# Patient Record
Sex: FEMALE | Race: WHITE | Employment: PART TIME | URBAN - METROPOLITAN AREA
[De-identification: names, ages, dates, MRNs, and addresses within clinical notes are randomized per-mention and may not be internally consistent; named-entity substitution may affect disease eponyms.]

---

## 2017-05-16 ENCOUNTER — HOSPITAL ENCOUNTER (OUTPATIENT)
Dept: GENERAL RADIOLOGY | Age: 37
Discharge: HOME | End: 2017-05-16
Attending: FAMILY MEDICINE
Payer: COMMERCIAL

## 2017-05-16 ENCOUNTER — OFFICE VISIT (OUTPATIENT)
Dept: FAMILY MEDICINE CLINIC | Facility: CLINIC | Age: 37
End: 2017-05-16

## 2017-05-16 VITALS
TEMPERATURE: 98 F | BODY MASS INDEX: 48.82 KG/M2 | RESPIRATION RATE: 16 BRPM | HEART RATE: 77 BPM | SYSTOLIC BLOOD PRESSURE: 123 MMHG | WEIGHT: 293 LBS | DIASTOLIC BLOOD PRESSURE: 85 MMHG | HEIGHT: 65 IN

## 2017-05-16 DIAGNOSIS — A09 DIARRHEA OF INFECTIOUS ORIGIN: ICD-10-CM

## 2017-05-16 DIAGNOSIS — J30.89 OTHER ALLERGIC RHINITIS: ICD-10-CM

## 2017-05-16 DIAGNOSIS — M25.562 ACUTE PAIN OF LEFT KNEE: ICD-10-CM

## 2017-05-16 DIAGNOSIS — M22.2X2 PATELLOFEMORAL PAIN SYNDROME OF LEFT KNEE: ICD-10-CM

## 2017-05-16 DIAGNOSIS — M25.562 ACUTE PAIN OF LEFT KNEE: Primary | ICD-10-CM

## 2017-05-16 PROCEDURE — 99214 OFFICE O/P EST MOD 30 MIN: CPT | Performed by: FAMILY MEDICINE

## 2017-05-16 PROCEDURE — 99212 OFFICE O/P EST SF 10 MIN: CPT | Performed by: FAMILY MEDICINE

## 2017-05-16 PROCEDURE — 73564 X-RAY EXAM KNEE 4 OR MORE: CPT | Performed by: FAMILY MEDICINE

## 2017-05-16 RX ORDER — MONTELUKAST SODIUM 10 MG/1
10 TABLET ORAL NIGHTLY
COMMUNITY
End: 2017-05-16

## 2017-05-16 RX ORDER — MONTELUKAST SODIUM 10 MG/1
10 TABLET ORAL NIGHTLY
Qty: 90 TABLET | Refills: 1 | Status: SHIPPED | OUTPATIENT
Start: 2017-05-16 | End: 2018-05-15

## 2017-05-16 RX ORDER — MELOXICAM 15 MG/1
15 TABLET ORAL DAILY
Qty: 30 TABLET | Refills: 0 | Status: SHIPPED | OUTPATIENT
Start: 2017-05-16 | End: 2017-06-15

## 2017-05-16 NOTE — PROGRESS NOTES
Patient ID: Serg Norton is a 30year old female. HPI  Patient presents with:  Pain: left leg   Diarrhea    She states for very long time she been having some left anterior knee pain. She can feel some crepitus in her left knee.   It is worse when she distress. Abdominal: Normal appearance and bowel sounds are normal. There is    no tenderness. There is no rigidity, no rebound, no guarding and negative  Shipley's sign. Neurological: Patient is alert and oriented to person, place, and time.    Skin: tablet (10 mg total) by mouth nightly. While here she would like a refill of her Singulair which she uses for allergies. Follow up if symptoms persist.  Take medicine (if given) as prescribed.   Approach to treatment discussed and patient/family membe

## 2017-05-17 ENCOUNTER — PATIENT MESSAGE (OUTPATIENT)
Dept: FAMILY MEDICINE CLINIC | Facility: CLINIC | Age: 37
End: 2017-05-17

## 2017-05-19 NOTE — TELEPHONE ENCOUNTER
From: Ciro Antony  To: Harper Padilla DO  Sent: 5/17/2017 8:24 PM CDT  Subject: Test Results Question    Hello. I had some knee xrays on my last visit. Where can I see the images and radiologist report?      Cheri Hurtado

## 2017-08-07 ENCOUNTER — OFFICE VISIT (OUTPATIENT)
Dept: FAMILY MEDICINE CLINIC | Facility: CLINIC | Age: 37
End: 2017-08-07

## 2017-08-07 VITALS
HEIGHT: 65 IN | SYSTOLIC BLOOD PRESSURE: 130 MMHG | DIASTOLIC BLOOD PRESSURE: 89 MMHG | WEIGHT: 293 LBS | TEMPERATURE: 99 F | BODY MASS INDEX: 48.82 KG/M2 | HEART RATE: 79 BPM | RESPIRATION RATE: 14 BRPM

## 2017-08-07 DIAGNOSIS — M54.50 CHRONIC LEFT-SIDED LOW BACK PAIN WITHOUT SCIATICA: ICD-10-CM

## 2017-08-07 DIAGNOSIS — G89.29 CHRONIC LEFT-SIDED LOW BACK PAIN WITHOUT SCIATICA: ICD-10-CM

## 2017-08-07 DIAGNOSIS — S39.012A LUMBAR STRAIN, INITIAL ENCOUNTER: Primary | ICD-10-CM

## 2017-08-07 DIAGNOSIS — S29.019A THORACIC MYOFASCIAL STRAIN, INITIAL ENCOUNTER: ICD-10-CM

## 2017-08-07 PROCEDURE — 99212 OFFICE O/P EST SF 10 MIN: CPT | Performed by: FAMILY MEDICINE

## 2017-08-07 PROCEDURE — 99214 OFFICE O/P EST MOD 30 MIN: CPT | Performed by: FAMILY MEDICINE

## 2017-08-07 RX ORDER — CYCLOBENZAPRINE HCL 10 MG
10 TABLET ORAL NIGHTLY
Qty: 30 TABLET | Refills: 1 | Status: SHIPPED | OUTPATIENT
Start: 2017-08-07 | End: 2017-08-27

## 2017-08-07 RX ORDER — MELOXICAM 15 MG/1
15 TABLET ORAL DAILY
Qty: 30 TABLET | Refills: 1 | Status: SHIPPED | OUTPATIENT
Start: 2017-08-07 | End: 2017-09-06

## 2017-08-07 NOTE — PROGRESS NOTES
Patient ID: Hudson Aranda is a 30year old female. HPI  Patient presents with:  Back Pain: left lower and upper back   For 1 month now off-and-on she has been having some left low back pain.   She states she has been having it since high school but was tenderness over the lumbar paraspinal muscles from L2 down to L5 bilaterally on the left side but very mild in nature.     She also has some minimal tenderness in the right rhomboid area but no spasm in this area  Deep tendon reflexes were 2 out of 4 bilate total) by mouth daily. With meals. (pain/inflammation). -     PHYSICAL THERAPY EXTERNAL    Patient Instructions   If still having pain in 6-8 weeks then please see me. Follow up if symptoms persist.  Take medicine (if given) as prescribed.   Approac

## 2017-08-17 NOTE — TELEPHONE ENCOUNTER
Pt is requesting an order for a QuantiFERON-TB Gold so she dont have to comeback a second time just want her blood drawn and requesting to get a callback once order is completed.

## 2017-08-17 NOTE — TELEPHONE ENCOUNTER
Pt requesting refill on medication ALBUTEROL INHALER prescribed by Dr. Sukhdeep Antony from [facility] and Pt is requesting a call when refilled.

## 2017-08-21 NOTE — TELEPHONE ENCOUNTER
Pt states she was in school for [employer] and her old test . She needs a new test done since she is applying for jobs.

## 2017-09-21 ENCOUNTER — OFFICE VISIT (OUTPATIENT)
Dept: FAMILY MEDICINE CLINIC | Facility: CLINIC | Age: 37
End: 2017-09-21

## 2017-09-21 VITALS
BODY MASS INDEX: 48.82 KG/M2 | WEIGHT: 293 LBS | SYSTOLIC BLOOD PRESSURE: 122 MMHG | HEIGHT: 65 IN | TEMPERATURE: 98 F | DIASTOLIC BLOOD PRESSURE: 84 MMHG | HEART RATE: 89 BPM

## 2017-09-21 DIAGNOSIS — G89.29 CHRONIC PAIN OF LEFT KNEE: Primary | ICD-10-CM

## 2017-09-21 DIAGNOSIS — M25.462 EFFUSION OF LEFT KNEE: ICD-10-CM

## 2017-09-21 DIAGNOSIS — M25.562 CHRONIC PAIN OF LEFT KNEE: Primary | ICD-10-CM

## 2017-09-21 DIAGNOSIS — M25.562 LEFT MEDIAL KNEE PAIN: ICD-10-CM

## 2017-09-21 PROCEDURE — 99214 OFFICE O/P EST MOD 30 MIN: CPT | Performed by: FAMILY MEDICINE

## 2017-09-21 PROCEDURE — 99212 OFFICE O/P EST SF 10 MIN: CPT | Performed by: FAMILY MEDICINE

## 2017-09-21 NOTE — PROGRESS NOTES
Patient ID: Chris Mcginnis is a 30year old female. HPI  Patient presents with:  Knee Pain  She had last seen he was doing physical therapy for her back.   She states for 4 weeks ago she was doing an exercise where she had her knees on the table and then weight (!) 343 lb (155.6 kg), not currently breastfeeding. This 30year old female is A&O in no acute distress.   KNEE EXAM: LEFT    Range of Motion 0-120 Degrees (decreased)   Effusion ++   Swelling None   Erythema None   Atrophy None       Strength

## 2017-10-10 ENCOUNTER — HOSPITAL ENCOUNTER (OUTPATIENT)
Dept: MRI IMAGING | Age: 37
Discharge: HOME | End: 2017-10-10
Attending: FAMILY MEDICINE
Payer: COMMERCIAL

## 2017-10-10 DIAGNOSIS — M25.562 LEFT MEDIAL KNEE PAIN: ICD-10-CM

## 2017-10-10 DIAGNOSIS — M25.462 EFFUSION OF LEFT KNEE: ICD-10-CM

## 2017-10-10 DIAGNOSIS — M25.562 CHRONIC PAIN OF LEFT KNEE: ICD-10-CM

## 2017-10-10 DIAGNOSIS — G89.29 CHRONIC PAIN OF LEFT KNEE: ICD-10-CM

## 2017-10-10 PROCEDURE — 73721 MRI JNT OF LWR EXTRE W/O DYE: CPT | Performed by: FAMILY MEDICINE

## 2018-05-15 ENCOUNTER — HOSPITAL ENCOUNTER (OUTPATIENT)
Dept: GENERAL RADIOLOGY | Age: 38
Discharge: HOME | End: 2018-05-15
Attending: FAMILY MEDICINE
Payer: COMMERCIAL

## 2018-05-15 ENCOUNTER — OFFICE VISIT (OUTPATIENT)
Dept: FAMILY MEDICINE CLINIC | Facility: CLINIC | Age: 38
End: 2018-05-15

## 2018-05-15 VITALS
DIASTOLIC BLOOD PRESSURE: 89 MMHG | TEMPERATURE: 98 F | OXYGEN SATURATION: 97 % | HEIGHT: 65 IN | WEIGHT: 293 LBS | HEART RATE: 83 BPM | BODY MASS INDEX: 48.82 KG/M2 | SYSTOLIC BLOOD PRESSURE: 131 MMHG

## 2018-05-15 DIAGNOSIS — G47.33 OBSTRUCTIVE SLEEP APNEA SYNDROME: ICD-10-CM

## 2018-05-15 DIAGNOSIS — N39.3 STRESS INCONTINENCE IN FEMALE: ICD-10-CM

## 2018-05-15 DIAGNOSIS — J45.20 MILD INTERMITTENT REACTIVE AIRWAY DISEASE WITHOUT COMPLICATION: ICD-10-CM

## 2018-05-15 DIAGNOSIS — J03.90 TONSILLITIS: ICD-10-CM

## 2018-05-15 DIAGNOSIS — J34.3 HYPERTROPHY, NASAL, TURBINATE: ICD-10-CM

## 2018-05-15 DIAGNOSIS — R05.9 COUGH: ICD-10-CM

## 2018-05-15 DIAGNOSIS — R06.83 SNORING: ICD-10-CM

## 2018-05-15 DIAGNOSIS — R05.9 COUGH: Primary | ICD-10-CM

## 2018-05-15 DIAGNOSIS — J30.89 OTHER ALLERGIC RHINITIS: ICD-10-CM

## 2018-05-15 PROCEDURE — 99215 OFFICE O/P EST HI 40 MIN: CPT | Performed by: FAMILY MEDICINE

## 2018-05-15 PROCEDURE — 99212 OFFICE O/P EST SF 10 MIN: CPT | Performed by: FAMILY MEDICINE

## 2018-05-15 PROCEDURE — 71046 X-RAY EXAM CHEST 2 VIEWS: CPT | Performed by: FAMILY MEDICINE

## 2018-05-15 RX ORDER — MONTELUKAST SODIUM 10 MG/1
10 TABLET ORAL NIGHTLY
Qty: 90 TABLET | Refills: 1 | Status: SHIPPED | OUTPATIENT
Start: 2018-05-15 | End: 2019-04-18

## 2018-05-15 RX ORDER — AZELASTINE HYDROCHLORIDE, FLUTICASONE PROPIONATE 137; 50 UG/1; UG/1
2 SPRAY, METERED NASAL 2 TIMES DAILY
Qty: 1 BOTTLE | Refills: 1 | Status: SHIPPED | OUTPATIENT
Start: 2018-05-15 | End: 2019-01-16

## 2018-05-15 RX ORDER — MONTELUKAST SODIUM 10 MG/1
10 TABLET ORAL NIGHTLY
Qty: 90 TABLET | Refills: 1 | Status: SHIPPED | OUTPATIENT
Start: 2018-05-15 | End: 2018-05-15

## 2018-05-15 RX ORDER — AZELASTINE HYDROCHLORIDE, FLUTICASONE PROPIONATE 137; 50 UG/1; UG/1
2 SPRAY, METERED NASAL 2 TIMES DAILY
Qty: 1 BOTTLE | Refills: 1 | Status: SHIPPED | OUTPATIENT
Start: 2018-05-15 | End: 2018-05-15

## 2018-05-15 NOTE — PROGRESS NOTES
Patient ID: Yosef Santoyo is a 41year old female. HPI  Patient presents with: Allergies    She wanted to be worked in today. She states on April 25, 2018 she went to immediate care if she felt sick and was coughing.   She states her heart was racin diaphoresis, fatigue and fever. HENT: Positive for congestion. Negative for ear pain, facial swelling, postnasal drip, rhinorrhea, sinus pressure, sore throat and trouble swallowing. Eyes: Negative for visual disturbance.    Respiratory: Positive for a bilaterally without exudate or erythema. Eyes: Conjunctivae and EOM are normal.   Neck: Neck supple. No thyromegaly present. Cardiovascular: Normal rate, regular rhythm and normal heart sounds.     Pulmonary/Chest: Effort normal and breath sounds normal. CHEST (CPT=71046); Future  She needs to lose weight as she is morbidly obese. Snoring  She has sleep apnea. Stress incontinence and female  Due to the harsh coughing but I think this is really due to allergies and not asthma.   If she really wants to se

## 2018-05-15 NOTE — PATIENT INSTRUCTIONS
Get over-the-counter Allegra which is fexofenadine 180 mg. Take that every morning along with the nasal spray. Do the nasal spray then again at dinner and then do the Singulair at bedtime.   I will see the allergist as well as the ENT doctor due to the en

## 2018-05-15 NOTE — TELEPHONE ENCOUNTER
Pt called in, states that she has allergies but this year they are \"a million times worse. \" States that she can't speak too long or she coughs.  Unsure if she is becoming asthmatic because she noticed that when she got a breathing treatment in [place] recently

## 2018-09-06 ENCOUNTER — OFFICE VISIT (OUTPATIENT)
Dept: FAMILY MEDICINE CLINIC | Facility: CLINIC | Age: 38
End: 2018-09-06
Payer: COMMERCIAL

## 2018-09-06 ENCOUNTER — APPOINTMENT (OUTPATIENT)
Dept: LAB | Age: 38
End: 2018-09-06
Attending: FAMILY MEDICINE
Payer: COMMERCIAL

## 2018-09-06 ENCOUNTER — LAB ENCOUNTER (OUTPATIENT)
Dept: LAB | Age: 38
End: 2018-09-06
Attending: FAMILY MEDICINE
Payer: COMMERCIAL

## 2018-09-06 VITALS
DIASTOLIC BLOOD PRESSURE: 86 MMHG | TEMPERATURE: 98 F | HEIGHT: 65 IN | HEART RATE: 74 BPM | BODY MASS INDEX: 48.82 KG/M2 | SYSTOLIC BLOOD PRESSURE: 120 MMHG | WEIGHT: 293 LBS

## 2018-09-06 DIAGNOSIS — E66.01 MORBID OBESITY DUE TO EXCESS CALORIES (HCC): ICD-10-CM

## 2018-09-06 DIAGNOSIS — G47.33 OBSTRUCTIVE SLEEP APNEA SYNDROME: ICD-10-CM

## 2018-09-06 DIAGNOSIS — Z00.00 ADULT GENERAL MEDICAL EXAM: Primary | ICD-10-CM

## 2018-09-06 DIAGNOSIS — Z00.00 ADULT GENERAL MEDICAL EXAM: ICD-10-CM

## 2018-09-06 DIAGNOSIS — J45.20 MILD INTERMITTENT ASTHMA WITHOUT COMPLICATION: ICD-10-CM

## 2018-09-06 LAB
ALBUMIN SERPL BCP-MCNC: 3.5 G/DL (ref 3.5–4.8)
ALBUMIN/GLOB SERPL: 1 {RATIO} (ref 1–2)
ALP SERPL-CCNC: 84 U/L (ref 32–100)
ALT SERPL-CCNC: 30 U/L (ref 14–54)
ANION GAP SERPL CALC-SCNC: 7 MMOL/L (ref 0–18)
AST SERPL-CCNC: 20 U/L (ref 15–41)
BASOPHILS # BLD: 0.1 K/UL (ref 0–0.2)
BASOPHILS NFR BLD: 1 %
BILIRUB SERPL-MCNC: 0.6 MG/DL (ref 0.3–1.2)
BUN SERPL-MCNC: 15 MG/DL (ref 8–20)
BUN/CREAT SERPL: 22.7 (ref 10–20)
CALCIUM SERPL-MCNC: 8.8 MG/DL (ref 8.5–10.5)
CHLORIDE SERPL-SCNC: 105 MMOL/L (ref 95–110)
CHOLEST SERPL-MCNC: 187 MG/DL (ref 110–200)
CO2 SERPL-SCNC: 27 MMOL/L (ref 22–32)
CREAT SERPL-MCNC: 0.66 MG/DL (ref 0.5–1.5)
EOSINOPHIL # BLD: 0.1 K/UL (ref 0–0.7)
EOSINOPHIL NFR BLD: 2 %
ERYTHROCYTE [DISTWIDTH] IN BLOOD BY AUTOMATED COUNT: 14.4 % (ref 11–15)
GLOBULIN PLAS-MCNC: 3.4 G/DL (ref 2.5–3.7)
GLUCOSE SERPL-MCNC: 93 MG/DL (ref 70–99)
HBA1C MFR BLD: 5.5 % (ref 4–6)
HCT VFR BLD AUTO: 40.4 % (ref 35–48)
HDLC SERPL-MCNC: 33 MG/DL
HGB BLD-MCNC: 13.1 G/DL (ref 12–16)
LDLC SERPL CALC-MCNC: 134 MG/DL (ref 0–99)
LYMPHOCYTES # BLD: 1.6 K/UL (ref 1–4)
LYMPHOCYTES NFR BLD: 25 %
MCH RBC QN AUTO: 27.8 PG (ref 27–32)
MCHC RBC AUTO-ENTMCNC: 32.5 G/DL (ref 32–37)
MCV RBC AUTO: 85.4 FL (ref 80–100)
MONOCYTES # BLD: 0.4 K/UL (ref 0–1)
MONOCYTES NFR BLD: 6 %
NEUTROPHILS # BLD AUTO: 4.4 K/UL (ref 1.8–7.7)
NEUTROPHILS NFR BLD: 67 %
NONHDLC SERPL-MCNC: 154 MG/DL
OSMOLALITY UR CALC.SUM OF ELEC: 289 MOSM/KG (ref 275–295)
PATIENT FASTING: YES
PLATELET # BLD AUTO: 266 K/UL (ref 140–400)
PMV BLD AUTO: 8.6 FL (ref 7.4–10.3)
POTASSIUM SERPL-SCNC: 4.1 MMOL/L (ref 3.3–5.1)
PROT SERPL-MCNC: 6.9 G/DL (ref 5.9–8.4)
RBC # BLD AUTO: 4.73 M/UL (ref 3.7–5.4)
SODIUM SERPL-SCNC: 139 MMOL/L (ref 136–144)
TRIGL SERPL-MCNC: 101 MG/DL (ref 1–149)
TSH SERPL-ACNC: 1.03 UIU/ML (ref 0.45–5.33)
WBC # BLD AUTO: 6.6 K/UL (ref 4–11)

## 2018-09-06 PROCEDURE — 85025 COMPLETE CBC W/AUTO DIFF WBC: CPT

## 2018-09-06 PROCEDURE — 84443 ASSAY THYROID STIM HORMONE: CPT

## 2018-09-06 PROCEDURE — 83036 HEMOGLOBIN GLYCOSYLATED A1C: CPT

## 2018-09-06 PROCEDURE — 80061 LIPID PANEL: CPT

## 2018-09-06 PROCEDURE — 99395 PREV VISIT EST AGE 18-39: CPT | Performed by: FAMILY MEDICINE

## 2018-09-06 PROCEDURE — 36415 COLL VENOUS BLD VENIPUNCTURE: CPT

## 2018-09-06 PROCEDURE — 93005 ELECTROCARDIOGRAM TRACING: CPT

## 2018-09-06 PROCEDURE — 80053 COMPREHEN METABOLIC PANEL: CPT

## 2018-09-06 PROCEDURE — 93010 ELECTROCARDIOGRAM REPORT: CPT | Performed by: FAMILY MEDICINE

## 2018-09-06 RX ORDER — BUDESONIDE AND FORMOTEROL FUMARATE DIHYDRATE 160; 4.5 UG/1; UG/1
AEROSOL RESPIRATORY (INHALATION)
Refills: 2 | COMMUNITY
Start: 2018-07-02 | End: 2020-04-08

## 2018-09-06 NOTE — PATIENT INSTRUCTIONS
Go ahead and get me the shot records and my fax number [phone] as well as notes from your other providers through [facility].

## 2018-09-06 NOTE — PROGRESS NOTES
Patient ID: Elvira Hyde is a 41year old female.     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 Neurological: Negative for dizziness, seizures, speech difficulty and light-headedness. Hematological: Negative for adenopathy. Does not bruise/bleed easily. Psychiatric/Behavioral: Positive for dysphoric mood.  Negative for hallucinations, light.   Neck: Normal range of motion. Neck supple. No thyromegaly present. Cardiovascular: Normal rate, regular rhythm and no murmur heard. Pulmonary/Chest: Effort normal and breath sounds normal. No respiratory distress. Abdominal: Soft.  Bowel sound due to excess calories (HCC)  -     HEMOGLOBIN A1C; Future  -     EKG 12-LEAD; Future  -     BARIATRICS - INTERNAL    Mild intermittent asthma without complication  Continue the Symbicort and continue to follow with ENT and pulmonology.       Referrals (if

## 2018-12-20 ENCOUNTER — TELEPHONE (OUTPATIENT)
Dept: FAMILY MEDICINE CLINIC | Facility: CLINIC | Age: 38
End: 2018-12-20

## 2018-12-21 RX ORDER — ALBUTEROL SULFATE 90 UG/1
2 AEROSOL, METERED RESPIRATORY (INHALATION) EVERY 6 HOURS PRN
Qty: 1 INHALER | Refills: 1 | Status: SHIPPED | OUTPATIENT
Start: 2018-12-21 | End: 2019-04-18

## 2019-01-16 ENCOUNTER — TELEPHONE (OUTPATIENT)
Dept: OTHER | Age: 39
End: 2019-01-16

## 2019-01-16 DIAGNOSIS — J30.89 OTHER ALLERGIC RHINITIS: Primary | ICD-10-CM

## 2019-01-16 RX ORDER — MOMETASONE 50 UG/1
2 SPRAY, METERED NASAL DAILY
Qty: 1 INHALER | Refills: 3 | Status: SHIPPED | OUTPATIENT
Start: 2019-01-16 | End: 2019-04-18

## 2019-01-16 NOTE — TELEPHONE ENCOUNTER
Patient called stating she needs a PA for Dymista. PA for Azelastine-Fluticasone (DYMISTA) 137-50 MCG/ACT Nasal Suspension completed with Emely via CMM response time 24-72 hours KEY [id number].

## 2019-01-16 NOTE — TELEPHONE ENCOUNTER
PA denied. Plan states preferred drugs are budesonide nasal suspension, flunisolide nasal suspension, fluticasone nasal suspension, mometasone nasal suspension, and triamcinolone nasal aerosol. Please advise.

## 2019-01-17 ENCOUNTER — OFFICE VISIT (OUTPATIENT)
Dept: FAMILY MEDICINE CLINIC | Facility: CLINIC | Age: 39
End: 2019-01-17
Payer: COMMERCIAL

## 2019-01-17 ENCOUNTER — NURSE TRIAGE (OUTPATIENT)
Dept: OTHER | Age: 39
End: 2019-01-17

## 2019-01-17 VITALS
SYSTOLIC BLOOD PRESSURE: 138 MMHG | DIASTOLIC BLOOD PRESSURE: 80 MMHG | HEART RATE: 108 BPM | OXYGEN SATURATION: 95 % | BODY MASS INDEX: 61 KG/M2 | HEIGHT: 65 IN | TEMPERATURE: 99 F

## 2019-01-17 DIAGNOSIS — N39.3 STRESS INCONTINENCE: ICD-10-CM

## 2019-01-17 DIAGNOSIS — J06.9 VIRAL UPPER RESPIRATORY TRACT INFECTION: ICD-10-CM

## 2019-01-17 DIAGNOSIS — J40 BRONCHITIS: Primary | ICD-10-CM

## 2019-01-17 PROCEDURE — 99214 OFFICE O/P EST MOD 30 MIN: CPT | Performed by: NURSE PRACTITIONER

## 2019-01-17 RX ORDER — PREDNISONE 20 MG/1
TABLET ORAL
Qty: 10 TABLET | Refills: 0 | Status: SHIPPED | OUTPATIENT
Start: 2019-01-17 | End: 2020-01-02 | Stop reason: ALTCHOICE

## 2019-01-17 RX ORDER — AZITHROMYCIN 250 MG/1
TABLET, FILM COATED ORAL
Qty: 6 TABLET | Refills: 0 | Status: SHIPPED | OUTPATIENT
Start: 2019-01-17 | End: 2019-04-18

## 2019-01-17 NOTE — TELEPHONE ENCOUNTER
Karo Cervantes      Future Appointments   Date Time Provider Ap Radha   1/17/2019  2:15 PM CARMEN Bautista, FNP-C [facility] ADO     Pt contacted and informed to come in for an office visit instead. Pt states she is on the way.

## 2019-01-17 NOTE — PATIENT INSTRUCTIONS
Bronchitis, Antibiotic Treatment (Adult)    Bronchitis is an infection of the air passages (bronchial tubes) in your lungs. It often occurs when you have a cold.  This illness is contagious during the first few days and is spread through the air by coughi Follow-up care  Follow up with your healthcare provider, or as advised. If you had an X-ray or ECG (electrocardiogram), a specialist will review it. You will be notified of any new findings that may affect your care.   If you are age 72 or older, or if you This technique is taught by a nurse or physical therapist. During the therapy, a small sensor is placed in your vagina or rectum. Another sensor is placed on your stomach. Other types of sensors are also available.  These sensors read signals from the pelvi

## 2019-01-17 NOTE — TELEPHONE ENCOUNTER
Action Requested: Summary for Provider     []  Critical Lab, Recommendations Needed  [x] Need Additional Advice  []   FYI    []   Need Orders  [x] Need Medications Sent to Pharmacy  []  Other     SUMMARY: Pt requesting for Z-Karson to be sent to pharmacy.  Ple

## 2019-01-17 NOTE — PROGRESS NOTES
HPI  Pt here for cold s/s that started about 4 days ago. Congestion, runny nose. Denies fever, ear pain, sore throat. Has h/o reactive airway diseases. Coughing has worsened and by last night was waking up every hour last night due to coughing.   Had estephanie Transportation needs - non-medical: Not on file    Occupational History      Not on file    Tobacco Use      Smoking status: Never Smoker      Smokeless tobacco: Never Used    Substance and Sexual Activity      Alcohol use: No        Alcohol/week: 0.0 oz Posterior oropharyngeal erythema present. No oropharyngeal exudate or posterior oropharyngeal edema. Eyes: Conjunctivae and EOM are normal. Pupils are equal, round, and reactive to light. Right eye exhibits no discharge. Left eye exhibits no discharge.

## 2019-01-18 NOTE — TELEPHONE ENCOUNTER
lmtcb    Kali Larson, DO   Em Fm Ado Lpn/Cma 2 days ago        nasonex sent in as dymista not covered.

## 2019-01-20 PROBLEM — J06.9 VIRAL UPPER RESPIRATORY TRACT INFECTION: Status: ACTIVE | Noted: 2019-01-20

## 2019-01-20 NOTE — ASSESSMENT & PLAN NOTE
zpack as directed  Use spacer with proair  Prednisone 20 mg 2 tabls po q day x 5 days  Supportive care discussed      Please call if symptoms worsen or are not resolving.

## 2019-04-16 DIAGNOSIS — J40 BRONCHITIS: ICD-10-CM

## 2019-04-16 NOTE — TELEPHONE ENCOUNTER
Pt states upper respiratory problem for four days with difficulty breathing as she has reactive airway disease. Denies fever, cp. Patient requesting refill of Prednisone 20 mg tabs.  OV scheduled with Dr. Meghana Baez 4/18 at 3:10 pm. Advised pt to go to ER if sy

## 2019-04-17 ENCOUNTER — TELEPHONE (OUTPATIENT)
Dept: FAMILY MEDICINE CLINIC | Facility: CLINIC | Age: 39
End: 2019-04-17

## 2019-04-17 NOTE — TELEPHONE ENCOUNTER
Paging    Message # [phone]         2019 08:11p   [THU]  To:  From:  OBDULIO Farley MD:  Phone#:  ----------------------------------------------------------------------  Kev Abbott [phone]  [address] PT OF JIM, YOUR OFFICE SAID THEY   WERE

## 2019-04-18 ENCOUNTER — HOSPITAL ENCOUNTER (OUTPATIENT)
Dept: GENERAL RADIOLOGY | Age: 39
Discharge: HOME | End: 2019-04-18
Attending: FAMILY MEDICINE
Payer: COMMERCIAL

## 2019-04-18 ENCOUNTER — OFFICE VISIT (OUTPATIENT)
Dept: FAMILY MEDICINE CLINIC | Facility: CLINIC | Age: 39
End: 2019-04-18
Payer: COMMERCIAL

## 2019-04-18 VITALS
HEIGHT: 65 IN | TEMPERATURE: 98 F | SYSTOLIC BLOOD PRESSURE: 130 MMHG | RESPIRATION RATE: 16 BRPM | DIASTOLIC BLOOD PRESSURE: 79 MMHG | BODY MASS INDEX: 61 KG/M2 | HEART RATE: 90 BPM

## 2019-04-18 DIAGNOSIS — R06.2 WHEEZING: ICD-10-CM

## 2019-04-18 DIAGNOSIS — R05.9 COUGH: Primary | ICD-10-CM

## 2019-04-18 DIAGNOSIS — J30.89 OTHER ALLERGIC RHINITIS: ICD-10-CM

## 2019-04-18 DIAGNOSIS — R05.9 COUGH: ICD-10-CM

## 2019-04-18 DIAGNOSIS — J03.90 TONSILLITIS: ICD-10-CM

## 2019-04-18 PROCEDURE — 99212 OFFICE O/P EST SF 10 MIN: CPT | Performed by: FAMILY MEDICINE

## 2019-04-18 PROCEDURE — 99214 OFFICE O/P EST MOD 30 MIN: CPT | Performed by: FAMILY MEDICINE

## 2019-04-18 PROCEDURE — 71046 X-RAY EXAM CHEST 2 VIEWS: CPT | Performed by: FAMILY MEDICINE

## 2019-04-18 RX ORDER — MOMETASONE 50 UG/1
2 SPRAY, METERED NASAL DAILY
Qty: 1 INHALER | Refills: 3 | Status: SHIPPED | OUTPATIENT
Start: 2019-04-18 | End: 2019-05-18

## 2019-04-18 RX ORDER — ALBUTEROL SULFATE 90 UG/1
2 AEROSOL, METERED RESPIRATORY (INHALATION) EVERY 6 HOURS PRN
Qty: 1 INHALER | Refills: 1 | Status: SHIPPED | OUTPATIENT
Start: 2019-04-18

## 2019-04-18 RX ORDER — GUAIFENESIN 600 MG
1200 TABLET, EXTENDED RELEASE 12 HR ORAL 2 TIMES DAILY
Qty: 20 TABLET | Refills: 0 | Status: SHIPPED | OUTPATIENT
Start: 2019-04-18 | End: 2020-01-02 | Stop reason: ALTCHOICE

## 2019-04-18 RX ORDER — PREDNISONE 20 MG/1
TABLET ORAL
Qty: 10 TABLET | Refills: 0 | Status: SHIPPED | OUTPATIENT
Start: 2019-04-18 | End: 2019-04-22

## 2019-04-18 RX ORDER — MONTELUKAST SODIUM 10 MG/1
10 TABLET ORAL NIGHTLY
Qty: 90 TABLET | Refills: 1 | Status: SHIPPED | OUTPATIENT
Start: 2019-04-18 | End: 2021-12-06

## 2019-04-18 NOTE — PROGRESS NOTES
Patient ID: Serg Norton is a 42year old female. HPI  Patient presents with:  Cough    She has had a cough and difficulty breathing since 4/12/19. Her sx are cough and rhinorrhea with green and yellowish mucous.   She feels something in her bronchiole Constitutional: Negative for chills and fever. HENT: Positive for rhinorrhea. Negative for voice change. Respiratory: Positive for cough and wheezing. Negative for chest tightness and shortness of breath. Cardiovascular: Negative for chest pain. rhinorrhea. Nose: Pale boggy nasal mucosa with clear rhinorrhea. Turbinates: mild congestion on the right and moderate congestion on the left. Tonsils: 2+  THROAT: Oropharynx looks crowded due to 2+ tonsils.  Without exudate   Eyes: Conjunctivae and EOM (SINGULAIR) 10 MG Oral Tab; Take 1 tablet (10 mg total) by mouth nightly. -     Albuterol Sulfate  (90 Base) MCG/ACT Inhalation Aero Soln; Inhale 2 puffs into the lungs every 6 (six) hours as needed for Wheezing or Shortness of Breath.   -     XR CH and agree that the record reflects my personal performance and is accurate and complete.   Masood Francis DO, 4/18/2019, 3:34 PM

## 2019-04-22 DIAGNOSIS — R06.2 WHEEZING: ICD-10-CM

## 2019-04-22 DIAGNOSIS — J03.90 TONSILLITIS: ICD-10-CM

## 2019-04-22 DIAGNOSIS — R05.9 COUGH: ICD-10-CM

## 2019-04-23 RX ORDER — PREDNISONE 20 MG/1
TABLET ORAL
Qty: 10 TABLET | Refills: 1 | Status: SHIPPED | OUTPATIENT
Start: 2019-04-23 | End: 2019-05-03

## 2019-04-23 NOTE — TELEPHONE ENCOUNTER
Pt is calling again to follow up refill request for Prednisone. She is getting anxious of may not be able to get the medication before 5 pm., leaving tonite. Dr Jennifer Michel please advise. Please call pt or send [employer] message if refill has been approved.  Torrie Covarrubias

## 2019-04-23 NOTE — TELEPHONE ENCOUNTER
Patient leaving in 10 hours to [facility] and indicated that Dr Brianna Dick recommended patient have it while in [place] especially because of the air quality. Please advise.

## 2019-04-23 NOTE — TELEPHONE ENCOUNTER
RN pls call pt and verify the need of rx refill, pls triage or offer ov if needed, thanks. LR 1-17-19 # 10    Review pended refill request as it does not fall under a protocol.   Requested Prescriptions     Pending Prescriptions Disp Refills   • PREDNIS

## 2019-05-03 ENCOUNTER — TELEPHONE (OUTPATIENT)
Dept: FAMILY MEDICINE CLINIC | Facility: CLINIC | Age: 39
End: 2019-05-03

## 2019-05-03 DIAGNOSIS — J03.90 TONSILLITIS: ICD-10-CM

## 2019-05-03 DIAGNOSIS — R05.9 COUGH: ICD-10-CM

## 2019-05-03 DIAGNOSIS — R06.2 WHEEZING: ICD-10-CM

## 2019-05-03 RX ORDER — PREDNISONE 20 MG/1
TABLET ORAL
Qty: 10 TABLET | Refills: 0 | Status: SHIPPED | OUTPATIENT
Start: 2019-05-03 | End: 2020-01-02 | Stop reason: ALTCHOICE
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
## 2019-05-22 RX ORDER — PREDNISONE 20 MG/1
TABLET ORAL
Qty: 10 TABLET | Refills: 0 | OUTPATIENT
Start: 2019-05-22

## 2020-01-02 ENCOUNTER — OFFICE VISIT (OUTPATIENT)
Dept: FAMILY MEDICINE CLINIC | Facility: CLINIC | Age: 40
End: 2020-01-02
Payer: COMMERCIAL

## 2020-01-02 ENCOUNTER — LAB ENCOUNTER (OUTPATIENT)
Dept: LAB | Age: 40
End: 2020-01-02
Attending: NURSE PRACTITIONER
Payer: COMMERCIAL

## 2020-01-02 VITALS
SYSTOLIC BLOOD PRESSURE: 126 MMHG | WEIGHT: 293 LBS | HEIGHT: 65 IN | DIASTOLIC BLOOD PRESSURE: 80 MMHG | BODY MASS INDEX: 48.82 KG/M2

## 2020-01-02 DIAGNOSIS — Z00.00 WELL ADULT EXAM: ICD-10-CM

## 2020-01-02 DIAGNOSIS — Z11.3 SCREEN FOR STD (SEXUALLY TRANSMITTED DISEASE): Primary | ICD-10-CM

## 2020-01-02 DIAGNOSIS — Z11.3 SCREEN FOR STD (SEXUALLY TRANSMITTED DISEASE): ICD-10-CM

## 2020-01-02 LAB
ALBUMIN SERPL-MCNC: 3.5 G/DL (ref 3.4–5)
ALBUMIN/GLOB SERPL: 0.9 {RATIO} (ref 1–2)
ALP LIVER SERPL-CCNC: 99 U/L (ref 37–98)
ALT SERPL-CCNC: 40 U/L (ref 13–56)
ANION GAP SERPL CALC-SCNC: 6 MMOL/L (ref 0–18)
AST SERPL-CCNC: 26 U/L (ref 15–37)
BASOPHILS # BLD AUTO: 0.03 X10(3) UL (ref 0–0.2)
BASOPHILS NFR BLD AUTO: 0.4 %
BILIRUB SERPL-MCNC: 0.5 MG/DL (ref 0.1–2)
BUN BLD-MCNC: 16 MG/DL (ref 7–18)
BUN/CREAT SERPL: 20.5 (ref 10–20)
CALCIUM BLD-MCNC: 9 MG/DL (ref 8.5–10.1)
CHLORIDE SERPL-SCNC: 108 MMOL/L (ref 98–112)
CHOLEST SMN-MCNC: 199 MG/DL (ref ?–200)
CO2 SERPL-SCNC: 27 MMOL/L (ref 21–32)
CREAT BLD-MCNC: 0.78 MG/DL (ref 0.55–1.02)
DEPRECATED RDW RBC AUTO: 42.4 FL (ref 35.1–46.3)
EOSINOPHIL # BLD AUTO: 0.14 X10(3) UL (ref 0–0.7)
EOSINOPHIL NFR BLD AUTO: 1.9 %
ERYTHROCYTE [DISTWIDTH] IN BLOOD BY AUTOMATED COUNT: 13.2 % (ref 11–15)
EST. AVERAGE GLUCOSE BLD GHB EST-MCNC: 117 MG/DL (ref 68–126)
GLOBULIN PLAS-MCNC: 4 G/DL (ref 2.8–4.4)
GLUCOSE BLD-MCNC: 84 MG/DL (ref 70–99)
HBA1C MFR BLD HPLC: 5.7 % (ref ?–5.7)
HCT VFR BLD AUTO: 42.9 % (ref 35–48)
HDLC SERPL-MCNC: 31 MG/DL (ref 40–59)
HGB BLD-MCNC: 13.3 G/DL (ref 12–16)
IMM GRANULOCYTES # BLD AUTO: 0.02 X10(3) UL (ref 0–1)
IMM GRANULOCYTES NFR BLD: 0.3 %
LDLC SERPL CALC-MCNC: 145 MG/DL (ref ?–100)
LYMPHOCYTES # BLD AUTO: 1.95 X10(3) UL (ref 1–4)
LYMPHOCYTES NFR BLD AUTO: 26.5 %
M PROTEIN MFR SERPL ELPH: 7.5 G/DL (ref 6.4–8.2)
MCH RBC QN AUTO: 27.2 PG (ref 26–34)
MCHC RBC AUTO-ENTMCNC: 31 G/DL (ref 31–37)
MCV RBC AUTO: 87.7 FL (ref 80–100)
MONOCYTES # BLD AUTO: 0.47 X10(3) UL (ref 0.1–1)
MONOCYTES NFR BLD AUTO: 6.4 %
NEUTROPHILS # BLD AUTO: 4.74 X10 (3) UL (ref 1.5–7.7)
NEUTROPHILS # BLD AUTO: 4.74 X10(3) UL (ref 1.5–7.7)
NEUTROPHILS NFR BLD AUTO: 64.5 %
NONHDLC SERPL-MCNC: 168 MG/DL (ref ?–130)
OSMOLALITY SERPL CALC.SUM OF ELEC: 292 MOSM/KG (ref 275–295)
PATIENT FASTING Y/N/NP: YES
PATIENT FASTING Y/N/NP: YES
PLATELET # BLD AUTO: 287 10(3)UL (ref 150–450)
POTASSIUM SERPL-SCNC: 4.1 MMOL/L (ref 3.5–5.1)
RBC # BLD AUTO: 4.89 X10(6)UL (ref 3.8–5.3)
SODIUM SERPL-SCNC: 141 MMOL/L (ref 136–145)
TRIGL SERPL-MCNC: 116 MG/DL (ref 30–149)
TSI SER-ACNC: 1.1 MIU/ML (ref 0.36–3.74)
VIT B12 SERPL-MCNC: 371 PG/ML (ref 193–986)
VLDLC SERPL CALC-MCNC: 23 MG/DL (ref 0–30)
WBC # BLD AUTO: 7.4 X10(3) UL (ref 4–11)

## 2020-01-02 PROCEDURE — 85025 COMPLETE CBC W/AUTO DIFF WBC: CPT

## 2020-01-02 PROCEDURE — 84443 ASSAY THYROID STIM HORMONE: CPT

## 2020-01-02 PROCEDURE — 82306 VITAMIN D 25 HYDROXY: CPT

## 2020-01-02 PROCEDURE — 87591 N.GONORRHOEAE DNA AMP PROB: CPT

## 2020-01-02 PROCEDURE — 80061 LIPID PANEL: CPT

## 2020-01-02 PROCEDURE — 36415 COLL VENOUS BLD VENIPUNCTURE: CPT

## 2020-01-02 PROCEDURE — 87389 HIV-1 AG W/HIV-1&-2 AB AG IA: CPT

## 2020-01-02 PROCEDURE — 80053 COMPREHEN METABOLIC PANEL: CPT

## 2020-01-02 PROCEDURE — 87491 CHLMYD TRACH DNA AMP PROBE: CPT

## 2020-01-02 PROCEDURE — 86780 TREPONEMA PALLIDUM: CPT

## 2020-01-02 PROCEDURE — 83036 HEMOGLOBIN GLYCOSYLATED A1C: CPT

## 2020-01-02 PROCEDURE — 82607 VITAMIN B-12: CPT

## 2020-01-02 PROCEDURE — 99395 PREV VISIT EST AGE 18-39: CPT | Performed by: NURSE PRACTITIONER

## 2020-01-02 RX ORDER — MOMETASONE 50 UG/1
SPRAY, METERED NASAL
COMMUNITY
Start: 2019-11-11 | End: 2021-08-16

## 2020-01-02 NOTE — PROGRESS NOTES
HPI    Patient presents for annual physical.  Positive for past medical history; history of asthma. Negative for complaints of health. Gyne history -   Last pap - 2 years ago, normal.    LMP - No longer gets period. Had iud.     Birth control - IUD Paternal Grandmother    • Heart Disorder Paternal Grandfather        Social History    Socioeconomic History      Marital status: Single      Spouse name: Not on file      Number of children: Not on file      Years of education: Not on file      [facility] ed Spacer/Aero Chamber Mouthpiece Does not apply Misc Use with HFA inhaler as directed 1 each 0   • SYMBICORT 160-4.5 MCG/ACT Inhalation Aerosol INHALE 2 PUFFS BY MOUTH TWICE DAILY  2       Allergies:    Seasonal                OTHER (SEE COMMENTS)    Physica B12    TSH W REFLEX TO FREE T4      Discussed plan of care with patient and patient is in agreement. All questions answered. Patient to call with questions or concerns. Encouraged to sign up for My Chart if not already registered.

## 2020-01-03 DIAGNOSIS — E55.9 VITAMIN D DEFICIENCY: Primary | ICD-10-CM

## 2020-01-03 LAB
25(OH)D3 SERPL-MCNC: 13 NG/ML (ref 30–100)
C TRACH DNA SPEC QL NAA+PROBE: NEGATIVE
N GONORRHOEA DNA SPEC QL NAA+PROBE: NEGATIVE
T PALLIDUM AB SER QL: NEGATIVE

## 2020-01-03 RX ORDER — ERGOCALCIFEROL 1.25 MG/1
50000 CAPSULE ORAL WEEKLY
Qty: 12 CAPSULE | Refills: 1 | Status: SHIPPED | OUTPATIENT
Start: 2020-01-03 | End: 2020-06-13

## 2020-01-30 ENCOUNTER — TELEPHONE (OUTPATIENT)
Dept: FAMILY MEDICINE CLINIC | Facility: CLINIC | Age: 40
End: 2020-01-30

## 2020-01-30 NOTE — TELEPHONE ENCOUNTER
Left detailed message concerning injections, advised to call back and make an appt.    Number and office hours provided

## 2020-01-30 NOTE — TELEPHONE ENCOUNTER
Patient reports seen in [address] [place] [address] Rd 1/2/20, had screening done for concerns of Syphilis exposure by partner. Test had come out negative, however partner had appt yesterday and was confirmed to be positive, started injection treatment yesterday.  Patient wanting to k

## 2020-02-03 ENCOUNTER — OFFICE VISIT (OUTPATIENT)
Dept: FAMILY MEDICINE CLINIC | Facility: CLINIC | Age: 40
End: 2020-02-03
Payer: COMMERCIAL

## 2020-02-03 VITALS
HEIGHT: 65 IN | HEART RATE: 80 BPM | SYSTOLIC BLOOD PRESSURE: 136 MMHG | WEIGHT: 293 LBS | BODY MASS INDEX: 48.82 KG/M2 | DIASTOLIC BLOOD PRESSURE: 83 MMHG

## 2020-02-03 DIAGNOSIS — Z20.2 EXPOSURE TO SYPHILIS: Primary | ICD-10-CM

## 2020-02-03 PROCEDURE — 99213 OFFICE O/P EST LOW 20 MIN: CPT | Performed by: NURSE PRACTITIONER

## 2020-02-03 PROCEDURE — 96372 THER/PROPH/DIAG INJ SC/IM: CPT | Performed by: NURSE PRACTITIONER

## 2020-02-03 NOTE — PATIENT INSTRUCTIONS
Syphilis  The sexually transmitted disease syphilis is a bacterial infection. Left untreated, it can cause heart and brain damage, even death. Pregnant women can infect their unborn babies. This can lead to deformities or even death.     People don’t talk © 1181-4465 [address]. [address] [facility], [facility] [place]. All rights reserved. This information is not intended as a substitute for professional medical care. Always follow your healthcare professional's instructions.

## 2020-02-03 NOTE — PROGRESS NOTES
HPI    Pt here for f/u Having BF having +RPR. At same time he had resp infection. Was given PCN shots. Did have sex once after pt's BF had first shot.      Review of Systems     02/03/20  1521   BP: 136/83   Pulse: 80   Weight: (!) 375 lb (170.1 kg)   He organization: Not on file        Attends meetings of clubs or organizations: Not on file        Relationship status: Not on file      Intimate partner violence:        Fear of current or ex partner: Not on file        Emotionally abused: Not on file

## 2020-04-08 ENCOUNTER — TELEPHONE (OUTPATIENT)
Dept: OTHER | Age: 40
End: 2020-04-08

## 2020-04-08 NOTE — TELEPHONE ENCOUNTER
Patient has asthma and reactive airway disease. No symptoms, but would like to have refill on symbicort and Rx for prednisone to have on hand just in case she needs it. 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    P

## 2020-04-09 RX ORDER — BUDESONIDE AND FORMOTEROL FUMARATE DIHYDRATE 160; 4.5 UG/1; UG/1
2 AEROSOL RESPIRATORY (INHALATION) 2 TIMES DAILY
Qty: 1 INHALER | Refills: 1 | Status: SHIPPED | OUTPATIENT
Start: 2020-04-09 | End: 2021-12-06

## 2020-04-09 NOTE — TELEPHONE ENCOUNTER
symbicort refilled. Please call if s/s of asthma worsen and are not relieved by sympbicort-we then can evaluate the need for steroids.

## 2020-04-11 ENCOUNTER — TELEPHONE (OUTPATIENT)
Dept: FAMILY MEDICINE CLINIC | Facility: CLINIC | Age: 40
End: 2020-04-11

## 2020-04-11 DIAGNOSIS — J30.89 OTHER ALLERGIC RHINITIS: ICD-10-CM

## 2020-04-11 DIAGNOSIS — R06.2 WHEEZING: ICD-10-CM

## 2020-04-11 DIAGNOSIS — J45.20 MILD INTERMITTENT REACTIVE AIRWAY DISEASE WITHOUT COMPLICATION: Primary | ICD-10-CM

## 2020-04-11 DIAGNOSIS — R05.9 COUGH: ICD-10-CM

## 2020-04-11 PROCEDURE — 99214 OFFICE O/P EST MOD 30 MIN: CPT | Performed by: FAMILY MEDICINE

## 2020-04-11 RX ORDER — PREDNISONE 20 MG/1
TABLET ORAL
Qty: 10 TABLET | Refills: 0 | Status: SHIPPED | OUTPATIENT
Start: 2020-04-11 | End: 2020-07-24

## 2020-04-11 NOTE — TELEPHONE ENCOUNTER
Virtual Check-In    TELEPHONE VISIT PROGRESS NOTE  Todays date: 4/11/2020 4:33 PM        Most recent Nurse Triage message / [address] [place] [address] [address] message from patient:      Claudy Chandler RN   Registered Nurse      Telephone Encounter   Signed   Encounter Date:  4/8/2020 I also received a page today stating that patient is wheezing and having asthma issues. She states this flared up about 2 days ago when her allergies got bad.   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 Chest pain:   Yes []     No [x]      ? Other symptoms:   ? Runny Nose Yes []     No [x]     ? Stuffy Nose Yes []     No [x]     ? Post Nasal Drip Yes [x]     No []         Past medical/social history:   ? Hypertension: Yes []     No [x]     ?  Allergic Rh She will make sure to  the Symbicort. She is on all of her allergy medications and will start taking the Symbicort today. I think 5 days of prednisone will be very helpful for her.   She remembers being on it in the past with me and states it helpe • SYMBICORT 160-4.5 MCG/ACT Inhalation Aerosol Inhale 2 puffs into the lungs 2 (two) times daily. 1 Inhaler 1   • ergocalciferol 1.25 MG (94749 UT) Oral Cap Take 1 capsule (50,000 Units total) by mouth once a week for 24 doses.  12 capsule 1   • Mometasone

## 2020-07-06 ENCOUNTER — TELEPHONE (OUTPATIENT)
Dept: FAMILY MEDICINE CLINIC | Facility: CLINIC | Age: 40
End: 2020-07-06

## 2020-07-06 NOTE — TELEPHONE ENCOUNTER
Patient requesting pre-op appt, will be having surgery to removal tonsils on 7/31 with Chelsea. Pre-op visit scheduled for 7/24 9:30am in office. 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  Pt denied currently having any sym

## 2020-07-07 NOTE — TELEPHONE ENCOUNTER
Noted.  Bring the paperwork your surgeon has for you with regard to the labs and of course call the information they have about what type of surgery will be having and the name of the surgeon etc.

## 2020-07-24 ENCOUNTER — LAB ENCOUNTER (OUTPATIENT)
Dept: LAB | Age: 40
End: 2020-07-24
Attending: FAMILY MEDICINE
Payer: COMMERCIAL

## 2020-07-24 ENCOUNTER — OFFICE VISIT (OUTPATIENT)
Dept: FAMILY MEDICINE CLINIC | Facility: CLINIC | Age: 40
End: 2020-07-24
Payer: COMMERCIAL

## 2020-07-24 VITALS
TEMPERATURE: 99 F | BODY MASS INDEX: 48.82 KG/M2 | WEIGHT: 293 LBS | SYSTOLIC BLOOD PRESSURE: 118 MMHG | HEIGHT: 65 IN | DIASTOLIC BLOOD PRESSURE: 85 MMHG | HEART RATE: 85 BPM

## 2020-07-24 DIAGNOSIS — J03.90 TONSILLITIS: ICD-10-CM

## 2020-07-24 DIAGNOSIS — Z01.818 PREOP EXAMINATION: ICD-10-CM

## 2020-07-24 DIAGNOSIS — J45.20 MILD INTERMITTENT REACTIVE AIRWAY DISEASE WITHOUT COMPLICATION: ICD-10-CM

## 2020-07-24 DIAGNOSIS — G47.33 OBSTRUCTIVE SLEEP APNEA SYNDROME: ICD-10-CM

## 2020-07-24 DIAGNOSIS — E55.9 VITAMIN D DEFICIENCY: ICD-10-CM

## 2020-07-24 DIAGNOSIS — E66.01 MORBID OBESITY DUE TO EXCESS CALORIES (HCC): ICD-10-CM

## 2020-07-24 DIAGNOSIS — J03.90 TONSILLITIS: Primary | ICD-10-CM

## 2020-07-24 LAB
APTT PPP: 28.6 SECONDS (ref 23.2–35.3)
BASOPHILS # BLD AUTO: 0.03 X10(3) UL (ref 0–0.2)
BASOPHILS NFR BLD AUTO: 0.4 %
DEPRECATED RDW RBC AUTO: 44.4 FL (ref 35.1–46.3)
EOSINOPHIL # BLD AUTO: 0.13 X10(3) UL (ref 0–0.7)
EOSINOPHIL NFR BLD AUTO: 1.7 %
ERYTHROCYTE [DISTWIDTH] IN BLOOD BY AUTOMATED COUNT: 13.7 % (ref 11–15)
HCT VFR BLD AUTO: 42.7 % (ref 35–48)
HGB BLD-MCNC: 13.4 G/DL (ref 12–16)
IMM GRANULOCYTES # BLD AUTO: 0.02 X10(3) UL (ref 0–1)
IMM GRANULOCYTES NFR BLD: 0.3 %
INR BLD: 0.94 (ref 0.9–1.2)
LYMPHOCYTES # BLD AUTO: 1.89 X10(3) UL (ref 1–4)
LYMPHOCYTES NFR BLD AUTO: 24.2 %
MCH RBC QN AUTO: 27.9 PG (ref 26–34)
MCHC RBC AUTO-ENTMCNC: 31.4 G/DL (ref 31–37)
MCV RBC AUTO: 88.8 FL (ref 80–100)
MONOCYTES # BLD AUTO: 0.47 X10(3) UL (ref 0.1–1)
MONOCYTES NFR BLD AUTO: 6 %
NEUTROPHILS # BLD AUTO: 5.28 X10 (3) UL (ref 1.5–7.7)
NEUTROPHILS # BLD AUTO: 5.28 X10(3) UL (ref 1.5–7.7)
NEUTROPHILS NFR BLD AUTO: 67.4 %
PLATELET # BLD AUTO: 289 10(3)UL (ref 150–450)
PROTHROMBIN TIME: 12.4 SECONDS (ref 11.8–14.5)
RBC # BLD AUTO: 4.81 X10(6)UL (ref 3.8–5.3)
WBC # BLD AUTO: 7.8 X10(3) UL (ref 4–11)

## 2020-07-24 PROCEDURE — 3079F DIAST BP 80-89 MM HG: CPT | Performed by: FAMILY MEDICINE

## 2020-07-24 PROCEDURE — 3074F SYST BP LT 130 MM HG: CPT | Performed by: FAMILY MEDICINE

## 2020-07-24 PROCEDURE — 3008F BODY MASS INDEX DOCD: CPT | Performed by: FAMILY MEDICINE

## 2020-07-24 PROCEDURE — 85025 COMPLETE CBC W/AUTO DIFF WBC: CPT

## 2020-07-24 PROCEDURE — 36415 COLL VENOUS BLD VENIPUNCTURE: CPT

## 2020-07-24 PROCEDURE — 93005 ELECTROCARDIOGRAM TRACING: CPT

## 2020-07-24 PROCEDURE — 93010 ELECTROCARDIOGRAM REPORT: CPT | Performed by: FAMILY MEDICINE

## 2020-07-24 PROCEDURE — 85610 PROTHROMBIN TIME: CPT

## 2020-07-24 PROCEDURE — 99243 OFF/OP CNSLTJ NEW/EST LOW 30: CPT | Performed by: FAMILY MEDICINE

## 2020-07-24 PROCEDURE — 82306 VITAMIN D 25 HYDROXY: CPT

## 2020-07-24 PROCEDURE — 85730 THROMBOPLASTIN TIME PARTIAL: CPT

## 2020-07-24 NOTE — PROGRESS NOTES
Patient ID: Karo Farah is a 47year old female. HPI  Patient presents with:  Pre-Op Exam: tonsils remove 7-     Pt is present today for a pre-surgical visit.  She is scheduled for a tonsillectomy on 7/31/20 with Dr. Christelle Watts at [facility] painful.       Wt Readings from Last 6 Encounters:  07/24/20 : (!) 395 lb 12.8 oz (179.5 kg)  02/03/20 : (!) 375 lb (170.1 kg)  01/02/20 : (!) 370 lb (167.8 kg)  09/06/18 : (!) 366 lb (166 kg)  05/15/18 : (!) 356 lb (161.5 kg)  09/21/17 : (!) 343 lb (155.6 Allergies:  Seasonal                OTHER (SEE COMMENTS)     Physical Exam:       Physical Exam   Physical Exam   Constitutional: Patient is oriented to person, place, and time. Patient appears well-developed and well-nourished. No distress.    HENT: tonsils are gone.   Mild intermittent reactive airway disease without complication  Uses her inhaler as needed  Morbid obesity due to excess calories [facility])  Understands she needs to change her diet and she is hoping that after tonsils are removed she can st

## 2020-07-26 ENCOUNTER — TELEPHONE (OUTPATIENT)
Dept: FAMILY MEDICINE CLINIC | Facility: CLINIC | Age: 40
End: 2020-07-26

## 2020-07-26 NOTE — TELEPHONE ENCOUNTER
Staff, please go ahead and send my note, her EKG and the labs that I just ordered to:       Dr. Akhil Cr at [address] [place] [address]. . Fax results to [phone] and  [phone].

## 2020-07-27 LAB — 25(OH)D3 SERPL-MCNC: 21.8 NG/ML (ref 30–100)

## 2020-08-04 ENCOUNTER — TELEPHONE (OUTPATIENT)
Dept: FAMILY MEDICINE CLINIC | Facility: CLINIC | Age: 40
End: 2020-08-04

## 2020-08-04 NOTE — TELEPHONE ENCOUNTER
Spoke with pt,  verified, pt stated she had her tonsil taken out last Friday and the surgery went well, she is feeling ok.    Pt stated her surgeon in [facility] is willing to give her something for pain but she need to p/u a hard copy due to its controlled

## 2020-08-21 ENCOUNTER — MED REC SCAN ONLY (OUTPATIENT)
Dept: FAMILY MEDICINE CLINIC | Facility: CLINIC | Age: 40
End: 2020-08-21

## 2020-08-22 NOTE — TELEPHONE ENCOUNTER
I am going to let her surgeon handle the pain. I think it is long enough out that she probably should be better by now.

## 2020-11-25 ENCOUNTER — TELEMEDICINE (OUTPATIENT)
Dept: FAMILY MEDICINE CLINIC | Facility: CLINIC | Age: 40
End: 2020-11-25

## 2020-11-25 ENCOUNTER — NURSE TRIAGE (OUTPATIENT)
Dept: FAMILY MEDICINE CLINIC | Facility: CLINIC | Age: 40
End: 2020-11-25

## 2020-11-25 DIAGNOSIS — U07.1 COVID-19 VIRUS INFECTION: Primary | ICD-10-CM

## 2020-11-25 DIAGNOSIS — J45.20 MILD INTERMITTENT ASTHMA WITHOUT COMPLICATION: ICD-10-CM

## 2020-11-25 PROCEDURE — 99213 OFFICE O/P EST LOW 20 MIN: CPT | Performed by: STUDENT IN AN ORGANIZED HEALTH CARE EDUCATION/TRAINING PROGRAM

## 2020-11-25 NOTE — PROGRESS NOTES
Virtual Telephone Check-In    Bekah Chavez verbally consents to a Virtual/Telephone Check-In visit on 11/25/20. Patient has been referred to the [facility] website at [email or web address] to review the yearly Consent to Treat document.     Patient [place] detailed in the plan of care above. Coding/billing information is submitted for this visit based on complexity of care and/or time spent for the visit. HPI:    Patient ID: Mari Shin is a 47year old female.     HPI  Pt presenting via video visit wit calculate BMI.    PHYSICAL EXAM:   Physical Exam   Vitals signs taken at home if available:     Limited examination for this telephone visit due to the coronavirus emergency     Patient was speaking in complete sentences, no increased work of breathing and

## 2020-11-25 NOTE — TELEPHONE ENCOUNTER
Action Requested: Summary for Provider     []  Critical Lab, Recommendations Needed  [] Need Additional Advice  [x]   FYI    []   Need Orders  [] Need Medications Sent to Pharmacy  []  Other     SUMMARY:   Spoke with pt,  verified, pt stated she went to if available. If you need to be around other people in or outside of the home, wear a facemask. 9. Avoid sharing personal items with other people in your household, like dishes, towels, and bedding   10.  Clean all surfaces that are touched often, like co

## 2020-11-27 ENCOUNTER — TELEPHONE (OUTPATIENT)
Dept: FAMILY MEDICINE CLINIC | Facility: CLINIC | Age: 40
End: 2020-11-27

## 2020-11-27 DIAGNOSIS — U07.1 COVID-19 VIRUS INFECTION: Primary | ICD-10-CM

## 2020-11-27 RX ORDER — BENZONATATE 200 MG/1
200 CAPSULE ORAL 3 TIMES DAILY PRN
Qty: 30 CAPSULE | Refills: 2 | Status: SHIPPED | OUTPATIENT
Start: 2020-11-27 | End: 2021-12-06

## 2020-11-27 NOTE — TELEPHONE ENCOUNTER
Verified name and . Patient states she tested positive for COVID-19 on 20- chart updated. Patient had a telemedicine visit on 20 with Dr. Daniella Luis.   She is calling with condition update stating that her cough has bee nonstop and is causing

## 2020-11-27 NOTE — TELEPHONE ENCOUNTER
Patient called to follow up on message    Informed Tessalon perles were sent to her pharmacy    Reviewed medication instructions with patient    She verbalized understanding   benzonatate 200 MG Oral Cap 30 capsule 2 11/27/2020     Sig - Route:  Take 1 caps

## 2020-11-30 NOTE — TELEPHONE ENCOUNTER
Triage team will monitor patient . Please DO NOT close this encounter    What was your temp today? 97.5    How did you take your temp?   with an oral thermometer    Are you feeling short of breath today?    No      Is the shortness of breath better, the sa

## 2020-12-01 ENCOUNTER — TELEPHONE (OUTPATIENT)
Dept: FAMILY MEDICINE CLINIC | Facility: CLINIC | Age: 40
End: 2020-12-01

## 2020-12-01 NOTE — TELEPHONE ENCOUNTER
[place] pt is calling today and she stated that she tested positive for covid on 11/24/2020. But her s/sx started on 11/18. Pt stated that she has been fever free for more then 24 hrs her s/sx are better and it has been more then 10 days.  Pt needs a let

## 2020-12-02 NOTE — TELEPHONE ENCOUNTER
Work excuse letter successfully faxed to [phone] Attn: Tamra Saldivar. Confirmation number placed in scanning.

## 2021-03-11 ENCOUNTER — MED REC SCAN ONLY (OUTPATIENT)
Dept: FAMILY MEDICINE CLINIC | Facility: CLINIC | Age: 41
End: 2021-03-11

## 2021-08-16 RX ORDER — MOMETASONE 50 UG/1
1 SPRAY, METERED NASAL DAILY
Qty: 3 EACH | Refills: 1 | Status: SHIPPED | OUTPATIENT
Start: 2021-08-16 | End: 2021-12-06

## 2021-08-16 NOTE — TELEPHONE ENCOUNTER
Pt has had increasing allergy symptoms. She will be traveling and doesn't want to be without her medication.   Please advise as this is on the historic list.

## 2021-08-19 ENCOUNTER — TELEPHONE (OUTPATIENT)
Dept: FAMILY MEDICINE CLINIC | Facility: CLINIC | Age: 41
End: 2021-08-19

## 2021-08-19 DIAGNOSIS — R06.2 WHEEZING: ICD-10-CM

## 2021-08-19 DIAGNOSIS — R05.9 COUGH: ICD-10-CM

## 2021-08-19 DIAGNOSIS — J30.89 OTHER ALLERGIC RHINITIS: ICD-10-CM

## 2021-08-19 NOTE — TELEPHONE ENCOUNTER
Prior authorization for Mometasone Furoate completed w/ [employer] on CoverMyMeds Key: [facility]    Outcome   Additional Information Required   Drug is covered by current benefit plan.  No further PA activity needed   DrugMometasone Furoate 50MCG/ACT praveen

## 2021-08-19 NOTE — TELEPHONE ENCOUNTER
Current Outpatient Medications:   •  Mometasone Furoate 50 MCG/ACT Nasal Suspension, 1 spray by Nasal route daily. , Disp: 3 each, Rfl: 1    Pharmacy message: Plan does not cover this medication.  Please call plan at [phone] to initiate prior authoriza

## 2021-11-11 ENCOUNTER — TELEPHONE (OUTPATIENT)
Dept: FAMILY MEDICINE CLINIC | Facility: CLINIC | Age: 41
End: 2021-11-11

## 2021-11-11 DIAGNOSIS — Z12.31 VISIT FOR SCREENING MAMMOGRAM: Primary | ICD-10-CM

## 2021-11-11 NOTE — TELEPHONE ENCOUNTER
Pt would like an order for mammogram. Pt has appt on 12/6 and needs to have it done at [address] per insurance.  Please Malou Bean

## 2021-11-11 NOTE — TELEPHONE ENCOUNTER
Spoke with patient verified name and . Informed patient order is ready to . Patient says she lives far, she will try to see if she can see it under MyChart if not, she will call back and will provide a fax number for us to fax.

## 2021-11-11 NOTE — TELEPHONE ENCOUNTER
Screening mammogram ordered. She can pick it up so she can take it to [address]  [facility].

## 2021-12-03 NOTE — TELEPHONE ENCOUNTER
Spoke to patient. She gave the fax number of [phone]. Faxed through [employer]. Asked her to call back if she needs anything else.

## 2021-12-06 ENCOUNTER — OFFICE VISIT (OUTPATIENT)
Dept: FAMILY MEDICINE CLINIC | Facility: CLINIC | Age: 41
End: 2021-12-06
Payer: COMMERCIAL

## 2021-12-06 VITALS
BODY MASS INDEX: 48.82 KG/M2 | HEART RATE: 85 BPM | WEIGHT: 293 LBS | HEIGHT: 65 IN | DIASTOLIC BLOOD PRESSURE: 70 MMHG | SYSTOLIC BLOOD PRESSURE: 122 MMHG | TEMPERATURE: 98 F

## 2021-12-06 DIAGNOSIS — Z12.31 VISIT FOR SCREENING MAMMOGRAM: ICD-10-CM

## 2021-12-06 DIAGNOSIS — E66.01 MORBID OBESITY DUE TO EXCESS CALORIES (HCC): ICD-10-CM

## 2021-12-06 DIAGNOSIS — J45.20 MILD INTERMITTENT REACTIVE AIRWAY DISEASE WITHOUT COMPLICATION: ICD-10-CM

## 2021-12-06 DIAGNOSIS — Z00.00 ADULT GENERAL MEDICAL EXAM: Primary | ICD-10-CM

## 2021-12-06 DIAGNOSIS — M79.622 AXILLARY TENDERNESS, LEFT: ICD-10-CM

## 2021-12-06 DIAGNOSIS — J30.89 OTHER ALLERGIC RHINITIS: ICD-10-CM

## 2021-12-06 DIAGNOSIS — S39.011A ABDOMINAL MUSCLE STRAIN, INITIAL ENCOUNTER: ICD-10-CM

## 2021-12-06 DIAGNOSIS — J45.20 MILD INTERMITTENT ASTHMA WITHOUT COMPLICATION: ICD-10-CM

## 2021-12-06 DIAGNOSIS — G47.33 OBSTRUCTIVE SLEEP APNEA SYNDROME: ICD-10-CM

## 2021-12-06 PROCEDURE — 99214 OFFICE O/P EST MOD 30 MIN: CPT | Performed by: FAMILY MEDICINE

## 2021-12-06 PROCEDURE — 99395 PREV VISIT EST AGE 18-39: CPT | Performed by: FAMILY MEDICINE

## 2021-12-06 PROCEDURE — 3008F BODY MASS INDEX DOCD: CPT | Performed by: FAMILY MEDICINE

## 2021-12-06 PROCEDURE — 3078F DIAST BP <80 MM HG: CPT | Performed by: FAMILY MEDICINE

## 2021-12-06 PROCEDURE — 3074F SYST BP LT 130 MM HG: CPT | Performed by: FAMILY MEDICINE

## 2021-12-06 RX ORDER — FLUOCINONIDE 0.5 MG/ML
1 SOLUTION TOPICAL AS DIRECTED
COMMUNITY

## 2021-12-06 RX ORDER — MOMETASONE 50 UG/1
2 SPRAY, METERED NASAL DAILY
COMMUNITY

## 2021-12-06 RX ORDER — FLUTICASONE FUROATE AND VILANTEROL TRIFENATATE 100; 25 UG/1; UG/1
POWDER RESPIRATORY (INHALATION)
COMMUNITY
Start: 2021-11-29

## 2021-12-06 NOTE — PATIENT INSTRUCTIONS
Prevention Guidelines, Women Ages 25 to 44  Screening tests and vaccines are an important part of managing your health. A screening test is done to find possible disorders or diseases in people who don't have any symptoms.  The goal is to find a disease e Type 2 diabetes, prediabetes All women diagnosed with gestational diabetes Lifelong testing every 3 years   Type 2 diabetes All women with prediabetes Every year   Gonorrhea Sexually active women at increased risk for infection At routine exams   Hepatitis Measles, mumps, rubella (MMR) All women in this age group who have no record of these infections or vaccines 1 or 2 doses   Meningococcal Women at increased risk for infection should talk with their healthcare provider 1 or more doses   Pneumococcal conjug © 6345-8286 [employer] [address] [facility], [facility] [place]. All rights reserved. This information is not intended as a substitute for professional medical care. Always follow your healthcare professional's instructions. no

## 2021-12-06 NOTE — PROGRESS NOTES
Patient ID: Juanita Newsome is a 46year old female. HPI  Patient presents with:  Physical    Last physical on 9/6/2018. Pt works at [address] [place] [address]. Pt c/o pimples in the axilla when using roll on deodorant.  She must use spray on deodorant but states s Pt is seeing a psychologist for anxiety. Pt is UTD on tetanus, flu and COVID-19 vaccines.  I advised her to discuss the pneumonia vaccine with her pulmonologist.     Asthma Action Plan Never done  Asthma Control Test Never done  COVID-19 Vaccine(1) Santino Taylor GFRAA 112 01/02/2020    GFRNAA 97 01/02/2020    CA 9.0 01/02/2020     01/02/2020    K 4.1 01/02/2020     01/02/2020    CO2 27.0 01/02/2020    OSMOCALC 292 01/02/2020     Lab Results   Component Value Date     01/02/2020    A1C 5.7 (H) Years of education: Not on file      Highest education level: Not on file    Occupational History      Not on file    Tobacco Use      Smoking status: Never Smoker      Smokeless tobacco: Never Used    Substance and Sexual Activity      Alcohol use:  No rhythm and no murmur heard. Pulmonary/Chest: Effort normal and breath sounds normal. No respiratory distress. Neurological: She is alert and oriented to person, place, and time. She has normal reflexes. No cranial nerve deficit.    Skin: Skin is warm an She states she does enjoy eating and does see a therapist for stress. Try to decrease her carbs and watch portions. Try to eat more fruits and vegetables. .  Abdominal muscle strain, initial encounter  She does talk about flexing forward to reach for her

## 2021-12-07 NOTE — PATIENT INSTRUCTIONS
Ask your pulmonologist about the Pneumovax 23 immunization due to your asthma. Find out from your hospital about the medical weight loss program for someone who does not want bariatric surgery at this time.

## 2022-01-25 NOTE — TELEPHONE ENCOUNTER
I spoke with patient verified name and . Patient states that she does not want the office notes she will be calling her employer to find out if there is a form they can provide instead, meanwhile, patient would like a basic note as proof that she had her physical exam on December.       Please sign off if appropriate

## 2022-01-25 NOTE — TELEPHONE ENCOUNTER
Patient is requesting copy of physical notes with health status included for her job and is requesting for it to be completed as soon as possible. Castillo Botello was requesting for it to be faxed over to compliance at her workplace and on [employer].   Fax: [phone]

## 2022-01-26 NOTE — TELEPHONE ENCOUNTER
Sent letter to employer fax# [phone]. Confirmation rec'd. Called patient, informed her that letter was faxed. Patient would like letter revised to state she is \"in good health to work and free of any communicable disease. \" Patient would like a call when letter is ready for .

## 2022-01-28 NOTE — TELEPHONE ENCOUNTER
Faxed letter requested 22 to number below with confirmation received and also spoke with patient to inform, name and  both verified.

## 2022-10-10 ENCOUNTER — NURSE TRIAGE (OUTPATIENT)
Dept: FAMILY MEDICINE CLINIC | Facility: CLINIC | Age: 42
End: 2022-10-10

## 2022-10-10 DIAGNOSIS — R05.9 COUGH: ICD-10-CM

## 2022-10-10 DIAGNOSIS — R06.2 WHEEZING: ICD-10-CM

## 2022-10-11 NOTE — TELEPHONE ENCOUNTER
Attempted to do a video visit but no answer. If she calls back then she should go to an immediate care or schedule for a video visit.

## 2022-12-09 ENCOUNTER — TELEPHONE (OUTPATIENT)
Dept: FAMILY MEDICINE CLINIC | Facility: CLINIC | Age: 42
End: 2022-12-09

## 2022-12-09 DIAGNOSIS — R05.9 COUGH: ICD-10-CM

## 2022-12-09 DIAGNOSIS — R06.2 WHEEZING: ICD-10-CM

## 2022-12-09 RX ORDER — ALBUTEROL SULFATE 90 UG/1
2 AEROSOL, METERED RESPIRATORY (INHALATION) EVERY 6 HOURS PRN
Qty: 1 EACH | Refills: 0 | Status: SHIPPED | OUTPATIENT
Start: 2022-12-09

## 2022-12-09 NOTE — TELEPHONE ENCOUNTER
Pt requesting Albuterol inhaler sent to [facility] in [facility]  \"I use it for a rescue and now that the weather is cold I am worried my lungs will spasm\"    Refilled per protocol

## 2023-09-28 NOTE — H&P
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 She first had mild symptoms almost 6 months ago however now is having quite severe pain especially on exertion in the epigastric and left upper abdominal area. She feels a mass in this area as well and presents for surgical consultation. Past medical history: History reviewed. No pertinent past medical history. Past surgical history: History reviewed. No pertinent surgical history. Allergies:   Seasonal                OTHER (SEE COMMENTS)  Codeine                 NAUSEA ONLY    Medications:   Current Outpatient Medications   Medication Sig Dispense Refill    cephalexin 500 MG Oral Cap TAKE 1 CAPSULE BY MOUTH THREE TIMES DAILY AT MEALTIME FOR 7 DAYS FOR INFECTION (Patient not taking: Reported on 9/26/2023)      albuterol 108 (90 Base) MCG/ACT Inhalation Aero Soln Inhale 2 puffs into the lungs every 6 (six) hours as needed for Wheezing or Shortness of Breath. 1 each 0    mometasone furoate 50 MCG/ACT Nasal Suspension 2 sprays by Nasal route daily.       BREO ELLIPTA 100-25 MCG/INH Inhalation Aerosol Powder, Breath Activated       Spacer/Aero Chamber Mouthpiece Does not apply Misc Use with HFA inhaler as directed 1 each 0       Social history:   Social History    Socioeconomic History      Marital status: Single    Tobacco Use      Smoking status: Never      Smokeless tobacco: Never    Substance and Sexual Activity      Alcohol use: No        Alcohol/week: 0.0 standard drinks of alcohol      Drug use: No       Family history:  Family History   Problem Relation Age of Onset    Cancer Mother     Diabetes Mother     Other (Other) Paternal Grandmother     Heart Disorder Paternal Grandfather         Review of Systems:   GENERAL: feels generally well  SKIN: no ulcerated or worrisome skin lesions  EYES:denies blurred vision or double vision  HEENT: denies new nasal congestion, sinus pain or ST  LUNGS: denies shortness of breath with exertion  CARDIOVASCULAR: denies chest pain on exertion  GI: no hematemesis, no BRBPR, no worsening heartburn  : no dysuria, no blood in urine, no difficulty urinating  MUSCULOSKELETAL: no new musculoskeletal complaints  NEURO: no persistent, recurrent  headaches  PSYCHE:no depression or anxiety  HEMATOLOGIC: no hx of blood dyscrasia  ENDOCRINE: no new endocrine problems  ALL/ASTHMA: no new hx of severe allergy or asthma  BACK: normal, no spinal deformity, no CVA tenderness    Physical examination:     Constitutional: appears well hydrated alert and responsive no acute distress noted  HEENT wnl, anicteric, PERRL, normocephalic, atraumatic  Neck supple, norm ROM, no JVD  L CTA B  H Reg rate  Abd soft, ND, upper abdominal midline and left sided tenderness, mass, possible hernia? No HSM. Extr no c/c/e  Skin intact, no jaundice, no rashes, no lesions  Neuro grossly intact, no focal deficits, no tremors  Back no deformity, no CVA tnd. Assessment and plan:  Diagnoses and all orders for this visit:    Abdominal wall mass  -     CT ABDOMEN+PELVIS(CONTRAST ONLY)(CPT=74177); Future    Pain of upper abdomen  -     CT ABDOMEN+PELVIS(CONTRAST ONLY)(CPT=74177); Future    Abdominal swelling  -     CT ABDOMEN+PELVIS(CONTRAST ONLY)(CPT=74177); Future       Obtain a CT scan of the abdomen and pelvis for better evaluation. We have discussed the differential diagnosis, work-up, treatment plan. All of the patient's questions have been answered to her satisfaction.   Thank you for the referral.    Nicole Dean MD  9/28/2023  11:51 AM

## 2023-10-05 NOTE — H&P
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 She first had mild symptoms almost 6 months ago however now is having quite severe pain especially on exertion in the epigastric and left upper abdominal area. She feels a mass in this area as well and presents for surgical consultation. CT reviewed, small umb H and adrenal mass. Past medical history: History reviewed. No pertinent past medical history. Past surgical history: History reviewed. No pertinent surgical history. Allergies:   Seasonal                OTHER (SEE COMMENTS)  Codeine                 NAUSEA ONLY    Medications:   Current Outpatient Medications   Medication Sig Dispense Refill    cephalexin 500 MG Oral Cap TAKE 1 CAPSULE BY MOUTH THREE TIMES DAILY AT MEALTIME FOR 7 DAYS FOR INFECTION (Patient not taking: Reported on 9/26/2023)      albuterol 108 (90 Base) MCG/ACT Inhalation Aero Soln Inhale 2 puffs into the lungs every 6 (six) hours as needed for Wheezing or Shortness of Breath. 1 each 0    mometasone furoate 50 MCG/ACT Nasal Suspension 2 sprays by Nasal route daily.       BREO ELLIPTA 100-25 MCG/INH Inhalation Aerosol Powder, Breath Activated       Spacer/Aero Chamber Mouthpiece Does not apply Misc Use with HFA inhaler as directed 1 each 0       Social history:   Social History    Socioeconomic History      Marital status: Single    Tobacco Use      Smoking status: Never      Smokeless tobacco: Never    Substance and Sexual Activity      Alcohol use: No        Alcohol/week: 0.0 standard drinks of alcohol      Drug use: No       Family history:  Family History   Problem Relation Age of Onset    Cancer Mother     Diabetes Mother     Other (Other) Paternal Grandmother     Heart Disorder Paternal Grandfather         Review of Systems:   GENERAL: feels generally well  SKIN: no ulcerated or worrisome skin lesions  EYES:denies blurred vision or double vision  HEENT: denies new nasal congestion, sinus pain or ST  LUNGS: denies shortness of breath with exertion  CARDIOVASCULAR: denies chest pain on exertion  GI: no hematemesis, no BRBPR, no worsening heartburn  : no dysuria, no blood in urine, no difficulty urinating  MUSCULOSKELETAL: no new musculoskeletal complaints  NEURO: no persistent, recurrent  headaches  PSYCHE:no depression or anxiety  HEMATOLOGIC: no hx of blood dyscrasia  ENDOCRINE: no new endocrine problems  ALL/ASTHMA: no new hx of severe allergy or asthma  BACK: normal, no spinal deformity, no CVA tenderness    Physical examination:     Constitutional: appears well hydrated alert and responsive no acute distress noted  HEENT wnl, anicteric, PERRL, normocephalic, atraumatic  Neck supple, norm ROM, no JVD  L CTA B  H Reg rate  Abd soft, ND, upper abdominal midline and left sided tenderness, mass, possible hernia? No HSM. Extr no c/c/e  Skin intact, no jaundice, no rashes, no lesions  Neuro grossly intact, no focal deficits, no tremors  Back no deformity, no CVA tnd. Assessment and plan:  Diagnoses and all orders for this visit:    Adrenal mass ([address]    Umbilical hernia without obstruction and without gangrene      CT reviewed. We have discussed the differential diagnosis, work-up, treatment plan. Recommend weight loss, follow up with PMD re adrenal mass. If the hernia needs repair, would [facility]. All of the patient's questions have been answered to her satisfaction.   Thank you for the referral.    Rubi Acosta MD

## 2023-10-12 NOTE — PROGRESS NOTES
Hiram Catalan is a 41year old female. This visit is conducted using Telemedicine with live, interactive video and audio. Patient has been referred to the [facility] website at [email or web address] to review the yearly Consent to Treat document. Patient understands and accepts financial responsibility for any deductible, co-insurance and/or co-pays associated with this service. HPI:   Pt reports travel end of Sept 2023, noticed nasal congestion and cough. Nasal congestion resolved but cough is not improving and feels it is dry and harsh/ Pt has asthma (mild intermittent) and reports it has been very well controlled. Last exacerbation has been a couple of years ago per patient. She has not needed albuterol in 2 years but started using it in the past 1-2 weeks due to cough. Does not use a daily controller inhaler due to sx being well controlled. She denies any fever, chills, fatigue, body aches, CP, SOB, dizziness, HA. Did not test for covid. Current Outpatient Medications   Medication Sig Dispense Refill    predniSONE 20 MG Oral Tab Take 1 tablet (20 mg total) by mouth 2 (two) times daily for 5 days. 10 tablet 0    cephalexin 500 MG Oral Cap TAKE 1 CAPSULE BY MOUTH THREE TIMES DAILY AT MEALTIME FOR 7 DAYS FOR INFECTION (Patient not taking: Reported on 9/26/2023)      albuterol 108 (90 Base) MCG/ACT Inhalation Aero Soln Inhale 2 puffs into the lungs every 6 (six) hours as needed for Wheezing or Shortness of Breath. 1 each 0    mometasone furoate 50 MCG/ACT Nasal Suspension 2 sprays by Nasal route daily. BREO ELLIPTA 100-25 MCG/INH Inhalation Aerosol Powder, Breath Activated       Spacer/Aero Chamber Mouthpiece Does not apply Misc Use with HFA inhaler as directed 1 each 0      No past medical history on file.    Social History:  Social History     Socioeconomic History    Marital status: Single   Tobacco Use    Smoking status: Never    Smokeless tobacco: Never   Substance and Sexual Activity    Alcohol use: No     Alcohol/week: 0.0 standard drinks of alcohol    Drug use: No        REVIEW OF SYSTEMS:   Review of Systems   All other systems reviewed and are negative. EXAM:   There were no vitals taken for this visit. Physical Exam  Constitutional:       General: She is not in acute distress. Pulmonary:      Effort: Pulmonary effort is normal. No respiratory distress. Neurological:      Mental Status: She is alert. Psychiatric:         Behavior: Behavior normal.         Judgment: Judgment normal.            ASSESSMENT AND PLAN:   1. Mild intermittent asthma with exacerbation  -Reviewed concerning s/s and when to go to ER  -Continue albuterol inhaler PRN. Pt reports she has a new inhaler.   -Start steroid burst, reviewed dose and s/e. -Hydrate, rest. Close f/u in 5-7 days, sooner PRN  - predniSONE 20 MG Oral Tab; Take 1 tablet (20 mg total) by mouth 2 (two) times daily for 5 days. Dispense: 10 tablet; Refill: 0       The patient indicates understanding of these issues and agrees to the plan. The patient is asked to return in PRN 5-7 days. The above note was creating using Dragon speech recognition technology. Please excuse any typos.

## 2023-10-12 NOTE — TELEPHONE ENCOUNTER
Please reply to pool: EM RN TRIAGE  Action Requested: Summary for Provider     []  Critical Lab, Recommendations Needed  [] Need Additional Advice  [x]   FYI    []   Need Orders  [] Need Medications Sent to Pharmacy  []  Other     SUMMARY: Patient contacts clinic reporting cough x 1 month. Want on a cruise in September and fell ill with viral illness, had and chest congestion, cough. Symptoms subsided but cough has remained. History of asthma. Chest feels inflamed. Denies fever, body aches or chills. Denies chest pain or shortness of breath. Requesting steroid. Acute visit booked virtual today.     Reason for call: Acute  Onset: Data Unavailable                       Reason for Disposition   Continuous (nonstop) coughing interferes with work or school and no improvement using cough treatment per Care Advice    Protocols used: Cough-A-OH

## 2024-01-05 NOTE — ASSESSMENT & PLAN NOTE
Wellness labs ordered.  Encouraged increasing physical activity which includes raising heart rate 3 to 5 days/week for at least 30 minutes at a time, while also performing mild strength exercises.  Patient counseled on importance of dietary modifications, which may include limiting fats, red meat, and carbohydrates, while increasing fruit and vegetable intake, and trying to adhere to a low sodium/Mediterranean diet.  Encouraged to manage stress.  Encouraged good sleep habits.  Encouraged good sexual health.    Patient aware of plan of care. All questions answered to satisfaction of the patient. Patient instructed to call office or reach out via [employer] if any issues arise. For urgent issues and/or reviewed red flags please proceed to the urgent care or ER.  Also, inform the nurse practitioner with any new symptoms or medication side effects.    Due for mammogram order placed

## 2024-01-05 NOTE — ASSESSMENT & PLAN NOTE
Patient was recently being evaluated for abdominal pain.  Incidental finding on abdominal CT for adrenal nodule.  Radiologist recommended MRI, patient prefers to be referred to endocrine for recommendations of monitoring before MRI order placed.  Referral placed

## 2024-01-05 NOTE — ASSESSMENT & PLAN NOTE
With left foot pain reports history of plantars fasciitis and known heel spur.  Patient states she is unable to stand or get out of bed without wearing her orthotics.  Patient interested in steroid injection.  Referral placed to podiatry.  X-ray left foot ordered to evaluate bone spur

## 2024-01-05 NOTE — PROGRESS NOTES
Subjective:   Sherrie Feliz is a 43 year old female who presents for Physical   Patient is a pleasant 43-year-old female with past medical history significant for asthma.    Overall good, knows she needs to lose weight.  Diet: Up and down, crappy at times, does shift work and call. Tries to do meal preps and that helps. [place] be starting 3 12 hour shifts so will be able to do more meal prep.  Exercise: walks at work, cruises a lot and walks a lot exploring. Wants to get into bands.   Sleep: Sleeps great, 5-6 hours is her normal, stays up watching tv.  Stress: Cares for mother at home, increased when caring for her and not on assignment. Would relax on couch for stressful day.   Social: dating for 10 years. He's in the army reserve/police. Live separate. Lives close to mom.  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 Travels from contract to [employer].  Vaccinations: declines PNA  Mammogram: ordered     Left heel pain, hx heel spur and plantar fascitiis has tried inserts, Hokas, and stretches, would like referral to pods          History reviewed. No pertinent past medical history.   History reviewed. No pertinent surgical history.     History/Other:    Chief Complaint Reviewed and Verified  No Further Nursing Notes to   Review  Tobacco Reviewed  Allergies Reviewed  Medications Reviewed    Problem List Reviewed  Medical History Reviewed  Surgical History   Reviewed  OB Status Reviewed  Family History Reviewed  Social History   Reviewed         Tobacco:  She has never smoked tobacco.    Current Outpatient Medications   Medication Sig Dispense Refill    albuterol 108 (90 Base) MCG/ACT Inhalation Aero Soln Inhale 2 puffs into the lungs every 6 (six) hours as needed for Wheezing or Shortness of Breath. 1 each 0    mometasone furoate 50 MCG/ACT Nasal Suspension 2 sprays by Nasal route daily.      BREO ELLIPTA 100-25 MCG/INH Inhalation Aerosol  Powder, Breath Activated       Spacer/Aero Chamber Mouthpiece Does not apply Misc Use with HFA inhaler as directed 1 each 0    cephalexin 500 MG Oral Cap TAKE 1 CAPSULE BY MOUTH THREE TIMES DAILY AT MEALTIME FOR 7 DAYS FOR INFECTION (Patient not taking: Reported on 9/26/2023)           Review of Systems:  Review of Systems   Constitutional: Negative.  Negative for activity change and appetite change.   HENT: Negative.  Negative for congestion, postnasal drip, rhinorrhea, sore throat, tinnitus and voice change.         No complaints since T and A   Eyes: Negative.    Respiratory: Negative.  Negative for apnea, cough, chest tightness and shortness of breath.    Cardiovascular:  Negative for chest pain and leg swelling.   Gastrointestinal:  Positive for blood in stool. Negative for abdominal pain and anal bleeding.        Reports occasional hemorrhoids   Genitourinary: Negative.  Negative for difficulty urinating, flank pain and menstrual problem.   Musculoskeletal:  Positive for arthralgias. Negative for joint swelling.   Skin: Negative.    Allergic/Immunologic: Positive for environmental allergies.   Neurological: Negative.  Negative for dizziness and headaches.   Psychiatric/Behavioral: Negative.  Negative for agitation.          Objective:   /86 (BP Location: Right arm)   Pulse 91   Ht 5' 5\" (1.651 m)   Wt (!) 429 lb 9.6 oz (194.9 kg)   BMI 71.49 kg/m²  Estimated body mass index is 71.49 kg/m² as calculated from the following:    Height as of this encounter: 5' 5\" (1.651 m).    Weight as of this encounter: 429 lb 9.6 oz (194.9 kg).  Physical Exam  Vitals and nursing note reviewed.   Constitutional:       General: She is not in acute distress.     Appearance: Normal appearance. She is well-developed. She is obese.   HENT:      Head: Normocephalic and atraumatic.      Right Ear: Tympanic membrane, ear canal and external ear normal.      Left Ear: Tympanic membrane, ear canal and external ear normal.       Nose: Congestion present.      Mouth/Throat:      Mouth: Mucous membranes are moist.      Pharynx: Oropharynx is clear.   Eyes:      Conjunctiva/sclera: Conjunctivae normal.      Pupils: Pupils are equal, round, and reactive to light.   Neck:      Thyroid: No thyromegaly.   Cardiovascular:      Rate and Rhythm: Normal rate and regular rhythm.      Pulses:           Dorsalis pedis pulses are 3+ on the right side and 3+ on the left side.      Heart sounds: Normal heart sounds. No murmur heard.  Pulmonary:      Effort: Pulmonary effort is normal. No respiratory distress.      Breath sounds: Normal breath sounds. No wheezing or rales.   Chest:      Chest wall: No tenderness.   Abdominal:      General: Bowel sounds are normal. There is no distension.      Palpations: Abdomen is soft.      Tenderness: There is no abdominal tenderness.   Musculoskeletal:         General: No tenderness. Normal range of motion.      Cervical back: Normal range of motion and neck supple.      Right lower le+ Edema present.      Left lower le+ Edema present.   Lymphadenopathy:      Cervical: No cervical adenopathy.   Skin:     General: Skin is warm and dry.      Capillary Refill: Capillary refill takes less than 2 seconds.      Findings: No rash.   Neurological:      Mental Status: She is alert and oriented to person, place, and time.      Coordination: Coordination normal.   Psychiatric:         Behavior: Behavior normal.         Thought Content: Thought content normal.         Judgment: Judgment normal.           Assessment & Plan:   1. Encounter for wellness examination in adult (Primary)  Assessment & Plan:  Wellness labs ordered.  Encouraged increasing physical activity which includes raising heart rate 3 to 5 days/week for at least 30 minutes at a time, while also performing mild strength exercises.  Patient counseled on importance of dietary modifications, which may include limiting fats, red meat, and carbohydrates, while  increasing fruit and vegetable intake, and trying to adhere to a low sodium/Mediterranean diet.  Encouraged to manage stress.  Encouraged good sleep habits.  Encouraged good sexual health.    Patient aware of plan of care. All questions answered to satisfaction of the patient. Patient instructed to call office or reach out via mychart if any issues arise. For urgent issues and/or reviewed red flags please proceed to the urgent care or ER.  Also, inform the nurse practitioner with any new symptoms or medication side effects.    Due for mammogram order placed  Orders:  -     Hemoglobin A1C; Future; Expected date: 01/05/2024  -     CBC With Differential With Platelet; Future; Expected date: 01/05/2024  -     Comp Metabolic Panel (14); Future; Expected date: 01/05/2024  -     Lipid Panel; Future; Expected date: 01/05/2024  -     TSH W Reflex To Free T4; Future; Expected date: 01/05/2024  2. Breast cancer screening by mammogram  Assessment & Plan:  Wellness labs ordered.  Encouraged increasing physical activity which includes raising heart rate 3 to 5 days/week for at least 30 minutes at a time, while also performing mild strength exercises.  Patient counseled on importance of dietary modifications, which may include limiting fats, red meat, and carbohydrates, while increasing fruit and vegetable intake, and trying to adhere to a low sodium/Mediterranean diet.  Encouraged to manage stress.  Encouraged good sleep habits.  Encouraged good sexual health.    Patient aware of plan of care. All questions answered to satisfaction of the patient. Patient instructed to call office or reach out via [email or web address] if any issues arise. For urgent issues and/or reviewed red flags please proceed to the urgent care or ER.  Also, inform the nurse practitioner with any new symptoms or medication side effects.    Due for mammogram order placed  Orders:  -     [facility] NARCISA 2D+3D SCREENING BILAT (CPT=77067/73491); Future; Expected date: 01/05/2024  3.  Abnormal finding of diagnostic imaging  Assessment & Plan:  Patient was recently being evaluated for abdominal pain.  Incidental finding on abdominal CT for adrenal nodule.  Radiologist recommended MRI, patient prefers to be referred to endocrine for recommendations of monitoring before MRI order placed.  Referral placed  Orders:  -     Endocrine Referral - In Network  4. Left foot pain  Assessment & Plan:  With left foot pain reports history of plantars fasciitis and known heel spur.  Patient states she is unable to stand or get out of bed without wearing her orthotics.  Patient interested in steroid injection.  Referral placed to podiatry.  X-ray left foot ordered to evaluate bone spur  Orders:  -     XR FOOT (2 VIEW), LEFT (CPT=73620); Future; Expected date: 01/05/2024  -     Podiatry Referral - In Network        Return for Pap.    ROBERTO CARLOS Blankenship, 1/5/2024, 2:21 PM

## 2024-09-26 NOTE — PROGRESS NOTES
Subjective:   Sherrie Feliz is a 44 year old female who presents for Lab Results (Discuss on the last labs ) and Knee Pain (Both knee but mostly the right, fell in July 2024, swollen , always on her feet)   Patient is a pleasant 43-year-old female with past medical history significant for asthma, prediabetes, adrenal nodule, and obesity. Patient presents to office today for follow up. Has not been seen since physical 1/18/24 where she was seen for heel spur and encouraged to follow up with endocrine for evaluation of adrenal nodule found on CT. She preferred to see endocrine before obtaining MRI. She was also provided with referral to pods.     Patient follows up today and states she meant to follow up but her mother passed away. She passed in February. She is interested in discussing labs and discussing weight loss. She is having issues and tearing up in office. She does report she is speaking with someone. She is struggling with the weight. She reports she recently fell in July. Fell on her right knee. She has been having pain and swelling since and knee will occasionally lock and give out. She also notes that swelling in lower extremities has been getting worse. She usually was able to elevate them at end of day and they would go down, but now swelling more resistant to this. She has tried OTC jase hose and is wondering about fitted ones. Finally, she would like to discuss weight loss. She knows most of her issues are related to her weight. She would like to talk about starting glp-1. She has tried to watch what she eats but hard since mother passed. She walks and exercises as much as she can with knee pain.         History reviewed. No pertinent past medical history.   History reviewed. No pertinent surgical history.     History/Other:    Chief Complaint Reviewed and Verified  Nursing Notes Reviewed and   Verified  Tobacco Reviewed  Allergies Reviewed  Medications Reviewed    Problem List Reviewed   Medical History Reviewed  Surgical History   Reviewed  OB Status Reviewed  Family History Reviewed  Social History   Reviewed         Tobacco:  She has never smoked tobacco.    Current Outpatient Medications   Medication Sig Dispense Refill    albuterol 108 (90 Base) MCG/ACT Inhalation Aero Soln Inhale 2 puffs into the lungs every 6 (six) hours as needed for Wheezing or Shortness of Breath. 1 each 0    cephalexin 500 MG Oral Cap TAKE 1 CAPSULE BY MOUTH THREE TIMES DAILY AT MEALTIME FOR 7 DAYS FOR INFECTION (Patient not taking: Reported on 9/26/2023)      mometasone furoate 50 MCG/ACT Nasal Suspension 2 sprays by Nasal route daily. (Patient not taking: Reported on 9/26/2024)      BREO ELLIPTA 100-25 MCG/INH Inhalation Aerosol Powder, Breath Activated  (Patient not taking: Reported on 9/26/2024)      Spacer/Aero Chamber Mouthpiece Does not apply Misc Use with HFA inhaler as directed (Patient not taking: Reported on 9/26/2024) 1 each 0         Review of Systems:  Review of Systems   Constitutional: Negative.  Negative for activity change, appetite change, chills and fever.   HENT: Negative.  Negative for congestion, postnasal drip, rhinorrhea, sore throat, tinnitus and voice change.    Eyes: Negative.    Respiratory: Negative.  Negative for apnea, cough, chest tightness and shortness of breath.    Cardiovascular:  Positive for leg swelling. Negative for chest pain.   Gastrointestinal: Negative.  Negative for abdominal pain, anal bleeding and blood in stool.   Genitourinary: Negative.  Negative for difficulty urinating, flank pain and menstrual problem.   Musculoskeletal:  Positive for arthralgias and joint swelling.   Skin: Negative.    Neurological: Negative.  Negative for dizziness and headaches.   Psychiatric/Behavioral:  Positive for dysphoric mood. Negative for agitation.          Objective:   Ht 5' 5\" (1.651 m)   Wt (!) 427 lb 9.6 oz (194 kg)   SpO2 98%   BMI 71.16 kg/m²  Estimated body mass index is  71.16 kg/m² as calculated from the following:    Height as of this encounter: 5' 5\" (1.651 m).    Weight as of this encounter: 427 lb 9.6 oz (194 kg).  Physical Exam  Vitals and nursing note reviewed.   Constitutional:       General: She is not in acute distress.     Appearance: She is well-developed. She is obese. She is not ill-appearing.   HENT:      Head: Normocephalic and atraumatic.      Right Ear: External ear normal.      Left Ear: External ear normal.   Neck:      Thyroid: No thyromegaly.   Cardiovascular:      Rate and Rhythm: Normal rate and regular rhythm.      Pulses: Normal pulses.      Heart sounds: Normal heart sounds. No murmur heard.  Pulmonary:      Effort: Pulmonary effort is normal. No respiratory distress.      Breath sounds: Normal breath sounds. No wheezing or rales.   Chest:      Chest wall: No tenderness.   Abdominal:      General: Bowel sounds are normal. There is no distension.      Palpations: Abdomen is soft.      Tenderness: There is no abdominal tenderness.   Musculoskeletal:         General: Swelling, tenderness and signs of injury present. Normal range of motion.      Cervical back: Normal range of motion and neck supple.      Right lower leg: 3+ Edema present.      Left lower leg: 3+ Edema present.      Comments: Right lateral aspect of knee     Lymphadenopathy:      Cervical: No cervical adenopathy.   Skin:     General: Skin is warm and dry.      Capillary Refill: Capillary refill takes less than 2 seconds.      Findings: No rash.   Neurological:      Mental Status: She is alert and oriented to person, place, and time.      Coordination: Coordination normal.   Psychiatric:         Behavior: Behavior normal.         Thought Content: Thought content normal.         Judgment: Judgment normal.           Assessment & Plan:   1. Morbid obesity due to excess calories (HCC) (Primary)  -     Hemoglobin A1C; Future; Expected date: 09/26/2024  2. Swelling of lower extremity  -     DME -  External  -     BNP (Brain Natriuretic Peptide); Future; Expected date: 09/26/2024  3. Right knee pain, unspecified chronicity  -     XR KNEE (1 OR 2 VIEWS), RIGHT (CPT=73560); Future; Expected date: 09/26/2024  -     Ortho Referral - In Network  4. Knee gives out, right  -     XR KNEE (1 OR 2 VIEWS), RIGHT (CPT=73560); Future; Expected date: 09/26/2024  -     Ortho Referral - In Network  5. Elevated alkaline phosphatase level  -     Comp Metabolic Panel (14); Future; Expected date: 09/26/2024  6. Prediabetes  -     Hemoglobin A1C; Future; Expected date: 09/26/2024  7. Adrenal nodule (HCC)    1. Morbid obesity due to excess calories (HCC)  BMI in office today 71.16  Will inquire into insurance which meds are covered for weight loss  Encouraged dietary and lifestyle changes  - Hemoglobin A1C; Future    2. Swelling of lower extremity  Hx of swelling  No pain  Worse now after long walks or work  Watches sodium intake.   Rx for jase hose written level 2  Check BNP, if elevated will order echo.   - DME - External  - BNP (Brain Natriuretic Peptide) [E]; Future    3. Right knee pain, unspecified chronicity  Trauma and fall in July  Continues to have issues and swelling  Xray knee ordered  Ortho referral  Encouraged weight loss  Educated on RICE  NSAIDs as needed. Encourage tylenol to minimize sodium   - XR KNEE (1 OR 2 VIEWS), RIGHT (CPT=73560); Future  - Ortho Referral - In Network    4. Knee gives out, right  Ortho referral   Xray in office today   - XR KNEE (1 OR 2 VIEWS), RIGHT (CPT=73560); Future  - Ortho Referral - In Network    5. Elevated alkaline phosphatase level  Reassess CMP in office today   - Comp Metabolic Panel (14) [E]; Future    6. Prediabetes  Reassess A1c in office today   - Hemoglobin A1C; Future    7. Adrenal nodule (HCC)  Reprinted referral to endocrine for eval  Encouraged to schedule    Patient aware of plan of care. All questions answered to satisfaction of the patient. Patient instructed to call  office or reach out via [email or web address] if any issues arise. For urgent issues and/or reviewed red flags please proceed to the urgent care or ER.  Also, inform the nurse practitioner with any new symptoms or medication side effects.        Return if symptoms worsen or fail to improve.    Durag [place], APRN, 9/25/2024, 9:30 PM

## 2024-09-27 NOTE — TELEPHONE ENCOUNTER
76y F with PMHx of Anxiety, CAD, hypothyroidism, IDDM, schizophrenia, dementia, s/p G-Tube, admitted for COVID + test at nursing home.    grade 2 compression socks, knee.    76y F with PMHx of Anxiety, CAD, hypothyroidism, IDDM, schizophrenia, dementia, s/p G-Tube, admitted for COVID + test at nursing home.     ED Course:   Sepsis Present on Admission, 100.7 F, HR 96, /70, 97% on nonrebreather   COVID (+)   Glucose 590, K 7.8 hemolyzed (5.7 non hemolyzed), Cr 3.5, lactate 2.9, AST/ALT 62/11, Anion Gap 26    Patient admitted for sepsis on admission from COVID infection.        76y F with PMHx of Anxiety, CAD, hypothyroidism, IDDM, schizophrenia, dementia, s/p G-Tube, admitted for COVID + test at nursing home.   Per daughter Kimberlee, patient started having chest tightness/cough on 7/16, and tested + for covid. She is vaccinated. Was sent to ED by NH for SOB after + covid test.   Patient's baseline over the last 2 months has been bed bound, largely non-verbal.   I spoke to daughter Kimberlee extensively, she does not want her mother to be intubated or on a ventilator, or want CPR compressions. patient is DNR/DNI.     ED Course:   Sepsis Present on Admission, 100.7 F, HR 96, /70, 97% on nonrebreather   COVID (+)   Glucose 590, K 7.8 hemolyzed (5.7 non hemolyzed), Cr 3.5, lactate 2.9, AST/ALT 62/11, Anion Gap 26    Patient admitted for sepsis on admission and Hypoxemic respiratory failure  from COVID infection.

## 2024-09-27 NOTE — TELEPHONE ENCOUNTER
From: Sherrie Feliz  To: Durga Grove  Sent: 9/27/2024 9:17 AM CDT  Subject: Weight loss drugs    Hello,     I called my insurance company and they told me wegovy is only covered if you are at risk for cardiovascular, hypertension, high cholesterol etc. [employer] and monjour[employer] are only covered for type 2. You would have to submit for pre auth for wegovy. Do you think I would qualify with my cholesterol numbers? Let me know what you think.     Thanks,     Sherrie

## 2024-09-28 NOTE — TELEPHONE ENCOUNTER
Please assist patient in scheduling telehealth. Cholesterol was not discussed last visit and order for wegovy cannot be placed for prior authorization. Thanks

## 2024-10-03 NOTE — PROGRESS NOTES
This is a telemedicine visit with live, interactive video and audio.     Patient understands and accepts financial responsibility for any deductible, co-insurance and/or co-pays associated with this service.    Patient is a pleasant 43-year-old female with past medical history significant for asthma, prediabetes, adrenal nodule, HLD, and obesity. Patient presents via telehealth today to discuss weight loss options. She reports she has been trying to eat better and exercise but unable to lose weight. She has a current weight of 427 with BMI of 71.16. She is interested in weight loss to help with risk of heart disease due to elevated LDL. She would like to lose 200lbs. Patient states she has called her insurance and wegovy is covered under her insurance for cholesterol and risk for heart disease. With her weight, cholesterol and prediabetes she is at risk for heart disease. Will start wegovy 0.25 mg subcutaenous weekly. Follow up 4 weeks in office.           SUBJECTIVE  Review of Systems   Constitutional: Negative.  Negative for activity change and appetite change.   HENT: Negative.  Negative for congestion, postnasal drip, rhinorrhea, sore throat, tinnitus and voice change.    Eyes: Negative.    Respiratory: Negative.  Negative for apnea, cough, chest tightness and shortness of breath.    Cardiovascular:  Negative for chest pain and leg swelling.   Gastrointestinal: Negative.  Negative for abdominal pain, anal bleeding, blood in stool, diarrhea, nausea and vomiting.   Genitourinary: Negative.  Negative for difficulty urinating, flank pain and menstrual problem.   Musculoskeletal: Negative.  Negative for joint swelling.   Skin: Negative.    Neurological: Negative.  Negative for dizziness and headaches.   Psychiatric/Behavioral: Negative.  Negative for agitation.         HISTORY:  No past medical history on file.   No past surgical history on file.   Family History   Problem Relation Age of Onset    Cancer Mother      Diabetes Mother     Other (Other) Paternal Grandmother     Heart Disorder Paternal Grandfather       Social History     Socioeconomic History    Marital status: Single   Tobacco Use    Smoking status: Never     Passive exposure: Never    Smokeless tobacco: Never   Vaping Use    Vaping status: Never Used   Substance and Sexual Activity    Alcohol use: Yes     Comment: rare    Drug use: No     Social Determinants of Health      [facility]        Allergies   Allergen Reactions    Seasonal OTHER (SEE COMMENTS)    Codeine NAUSEA ONLY      Current Outpatient Medications   Medication Sig Dispense Refill    cephalexin 500 MG Oral Cap TAKE 1 CAPSULE BY MOUTH THREE TIMES DAILY AT MEALTIME FOR 7 DAYS FOR INFECTION (Patient not taking: Reported on 9/26/2023)      albuterol 108 (90 Base) MCG/ACT Inhalation Aero Soln Inhale 2 puffs into the lungs every 6 (six) hours as needed for Wheezing or Shortness of Breath. 1 each 0    mometasone furoate 50 MCG/ACT Nasal Suspension 2 sprays by Nasal route daily. (Patient not taking: Reported on 9/26/2024)      BREO ELLIPTA 100-25 MCG/INH Inhalation Aerosol Powder, Breath Activated  (Patient not taking: Reported on 9/26/2024)      Spacer/Aero Chamber Mouthpiece Does not apply Misc Use with HFA inhaler as directed (Patient not taking: Reported on 9/26/2024) 1 each 0       OBJECTIVE  Physical Exam:   alert, appears stated age, and cooperative, Speaking in full sentences comfortably, and Normal work of breathing    ASSESSMENT & PLAN  Problem List Items Addressed This Visit          Unprioritized    Morbid obesity due to excess calories (HCC) - Primary     Other Visit Diagnoses       Prediabetes        Hyperlipidemia, unspecified hyperlipidemia type               1. Morbid obesity due to excess calories (HCC)  BMI from last time in office 71.16 with weight of 427 and 9.6 ounces  Morbid obesity with prediabetes and elevated cholesterol cardiovascular  risk elevated with possible metabolic syndrome.  Has tried diet and weight loss with exercise.  No luck  Would like to try GLP-1  Start Wegovy 0.25 mg weekly subcutaneous x 4  Educated on injection technique  Follow-up in office 4 weeks  Patient has history of nausea, requesting as needed Zofran if needed.  Rx sent  Goal weight 225  - semaglutide-weight management 0.25 MG/0.5ML Subcutaneous Solution Auto-injector; Inject 0.5 mL (0.25 mg total) into the skin once a week for 4 doses.  Dispense: 2 mL; Refill: 0    2. Prediabetes  Lab Results   Component Value Date    A1C 5.8 (H) 09/26/2024    A1C 5.9 (H) 01/18/2024    A1C 5.7 (H) 01/02/2020    A1C 5.5 09/06/2018   Morbid obesity with prediabetes last A1c 5.8.  Metabolic syndrome  Increased risk of cardiovascular disease  Weight loss encouraged unable to lose with diet and exercise.    Start Wegovy 0.25 mg subcutaneous weekly.    ollow-up 4 weeks    - semaglutide-weight management 0.25 MG/0.5ML Subcutaneous Solution Auto-injector; Inject 0.5 mL (0.25 mg total) into the skin once a week for 4 doses.  Dispense: 2 mL; Refill: 0    3. Hyperlipidemia, unspecified hyperlipidemia type  The ASCVD Risk score (Madras DK, et al., 2019) failed to calculate for the following reasons:    The systolic blood pressure is missing    - semaglutide-weight management 0.25 MG/0.5ML Subcutaneous Solution Auto-injector; Inject 0.5 mL (0.25 mg total) into the skin once a week for 4 doses.  Dispense: 2 mL; Refill: 0    4. At risk for heart disease  Morbid obesity, elevated LDL, prediabetes, borderline hypertension.  Metabolic syndrome  Start GLP-1, Wegovy 0.25 mg subcutaneous weekly.  Follow-up 4 weeks  - semaglutide-weight management 0.25 MG/0.5ML Subcutaneous Solution Auto-injector; Inject 0.5 mL (0.25 mg total) into the skin once a week for 4 doses.  Dispense: 2 mL; Refill: 0    5. History of nausea  Does not like to be nauseous  Educated on possible side effects on Wegovy  Requesting rescue  Zofran if needed  Rx sent  - ondansetron (ZOFRAN) 4 mg tablet; Take 1 tablet (4 mg total) by mouth every 8 (eight) hours as needed for Nausea.  Dispense: 20 tablet; Refill: 0    Patient aware of plan of care. All questions answered to satisfaction of the patient. Patient instructed to call office or reach out via [facility] if any issues arise. For urgent issues and/or reviewed red flags please proceed to the urgent care or ER.  Also, inform the nurse practitioner with any new symptoms or medication side effects.        Durga [place], APRN

## 2024-10-04 NOTE — TELEPHONE ENCOUNTER
We can for sure consider vascular referral if needed. Lets await the response of patient for appeal or buying out of pocket. If not I will refer to vascular for eval. Thanks

## 2024-10-04 NOTE — TELEPHONE ENCOUNTER
Do you want to refer her to see Vascular? She is stating compression socking's are not covered under insurance and she is stating her legs are painful.  Please advise.

## 2024-10-07 NOTE — TELEPHONE ENCOUNTER
Patient is seeing Dr. Cooley for solomon knee pain (right knee is worse). Please advise if imaging is needed.  Future Appointments   Date Time Provider Department Center   10/18/2024 11:30 AM Siddhartha Cooley MD [facility]   10/25/2024  9:30 AM Reymundo Basurto DPM [id number]   3/19/2025  9:00 AM Dary Roger MD [facility]

## 2024-10-07 NOTE — TELEPHONE ENCOUNTER
Patient was scheduling incorrectly. Ailyn does not see for knees, can we please schedule with the appropriate provider? Thanks!

## 2024-10-09 NOTE — TELEPHONE ENCOUNTER
XR ordered and scheduled per Ortho protocol.     Sent patient message via [facility] to inform them and ask them to arrive 15-20 minutes prior to appointment with Dr. Cooley

## 2024-10-11 NOTE — TELEPHONE ENCOUNTER
Carlos from [facility] insurance called to request prior authorization on Rx wegovy. Please advise         Current Outpatient Medications   Medication Sig Dispense Refill    semaglutide-weight management 0.25 MG/0.5ML Subcutaneous Solution Auto-injector Inject 0.5 mL (0.25 mg total) into the skin once a week for 4 doses. 2 mL 0

## 2024-10-14 NOTE — PROGRESS NOTES
Orthopaedic Surgery  [address] [address] [address]   [phone]      Chief Complaint:  Right Knee Pain    History of Present Illness:   Sherrie Feliz is a 44 year old female seen in clinic today as new for evaluation of their right knee. She reports an injury after a slip and fall at work onto her knee. Following this injury she had bruising and pain. Patient does report that following the injury she went on a trip to [place] where she did extensive walking. Pain is located over the medial aspect of the knee and described as aching. Reports that the area of bruising has been tender ever since. Their joint pain interferes with their activities of daily life such as walking, going up or down stairs, and exercising. Their condition is not improving.  Associated with their joint pain, they report catching or locking. Their symptoms are worsened by weightbearing activity and as the day progresses. They have difficulty with walking prolonged distances on flat surfaces as well as stairs. Their symptoms are made better by rest. They report no lower back or hip pain. No prior surgeries to the involved knee.    Prior imaging studies have included XRs. Treatment so far has consisted of conservative management including NSAIDs and combination of rest, ice, or elevation, stretching and topical medications. The stretching and topical medication provided relief but limited in duration.    Activities of Daily Life:  They use stairs: yes but difficult  Assistive devices used: none  Able to rise from a chair: Yes but with difficulty        PMH/PSH/Family History/Social History/Meds/Allergies:   History reviewed. No pertinent past medical history.     History reviewed. No pertinent surgical history.     Family History   Problem Relation Age of Onset    Cancer Mother     Diabetes Mother     Other (Other) Paternal Grandmother     Heart Disorder Paternal Grandfather         Social History     Socioeconomic History     Marital status: Single     Spouse name: Not on file    Number of children: Not on file    Years of education: Not on file    Highest education level: Not on file   Occupational History    Not on file   Tobacco Use    Smoking status: Never     Passive exposure: Never    Smokeless tobacco: Never   Vaping Use    Vaping status: Never Used   Substance and Sexual Activity    Alcohol use: Yes     Comment: rare    Drug use: No    Sexual activity: Not on file   Other Topics Concern    Not on file   Social History Narrative    Not on file     Social Drivers of Health     Financial Resource Strain: Not on file   Food Insecurity: Not on file   Transportation Needs: Not on file   Physical Activity: Not on file   Stress: Not on file   Social Connections: Not on file   Housing Stability: At Risk (8/18/2023)    Received from [facility]     Living Situation: Not on file     Housing Problems: Not on file        Current Outpatient Medications   Medication Instructions    albuterol 108 (90 Base) MCG/ACT Inhalation Aero Soln 2 puffs, Inhalation, Every 6 hours PRN    BREO ELLIPTA 100-25 MCG/INH Inhalation Aerosol Powder, Breath Activated No dose, route, or frequency recorded.    cephalexin 500 MG Oral Cap TAKE 1 CAPSULE BY MOUTH THREE TIMES DAILY AT MEALTIME FOR 7 DAYS FOR INFECTION    mometasone furoate 50 MCG/ACT Nasal Suspension 2 sprays, Daily    ondansetron (ZOFRAN) 4 mg, Oral, Every 8 hours PRN    semaglutide-weight management (WEGOVY) 0.25 mg, Subcutaneous, Weekly    Spacer/Aero Chamber Mouthpiece Does not apply Misc Use with HFA inhaler as directed        Allergies[1]       Physical Exam:   Vitals:    10/14/24 1153   Weight: (!) 427 lb (193.7 kg)   Height: 5' 5\" (1.651 m)     Estimated body mass index is 71.06 kg/m² as calculated from the following:    Height as of this encounter: 5' 5\" (1.651 m).    Weight as of this encounter: 427 lb (193.7 kg).    Constitutional: No acute distress, well  nourished.  Eyes: Anicteric sclera, pink conjunctiva  Ears, Nose, Mouth and Throat: Normocephalic, atraumatic, moist mucous membranes  Cardiovascular: No pitting edema or varicosities in the lower extremities. Maneuvers demonstrate a negative Forrest's sign. No palpable cords in calf noted.   Respiratory: No respiratory distress, normal respiratory rhythm and effort   Neurological:  Oriented to person, place, and time.  Psychological:  Appropriate mood and affect.    Comprehensive right Knee Exam:      Inspection: No erythema, ecchymoses, or wounds. No rash. No previous incisions noted.  Alignment: mild varus  ROM: 0 - 100 degrees, flexion contracture: 0 degrees, quad lag: no  Stability: A/P stress: stable, firm endpoint, M/L stress: stable, firm endpoint  Pain or crepitus with ROM?: Yes  Tenderness to palpation at: medial joint line, lateral joint line, medial knee about the pes anserinus  Strength: Intact 5/5 strength SLR and TA/GS/FHL/EHL  Sensation: Grossly intact to light touch over SPN/DPN/Saph/Sural/Tibial nerve distributions  Vasc: Warm perfused extremity        Imaging:   Weightbearing XRs of the bilateral knee were obtained with AP, PA Flex, sunrise, and lateral views    They show moderate degenerative changes of the knee involving the medial compartment. No fracture or dislocation seen    I personally reviewed and interpreted the radiographs.      Assessment:     ICD-10-CM    1. Locking of right knee  M23.91 MRI KNEE, RIGHT ([id number])      2. Primary osteoarthritis of right knee  M17.11       3. Pes anserinus tendinitis and bursitis  M76.899            Plan:   Patient with medial compartment joint space narrowing consistent with osteoarthritis. She is also point tender at her pes bursa insertion at the proximal tibia. I am concerned however that she reports mechanical symptoms about the knee and would like to obtain an MRI of her Right knee to rule out meniscus tear in the setting of her injury. She should  continue conservative management for now with rest, NSAIDs + Tylenol, stretching, and topical ointments. Based on her MRI results, we will discuss corticosteroid injection v knee arthroscopy if needed. She is working on weight loss. Follow up after MRI.    Thank you very much for allowing me to participate in the care of this patient. If you have any questions, please do not hesitate to contact me.    Siddhartha Cooley MD  Adult Reconstruction    Department of Orthopaedic Surgery  [facility]     [address] [place] [address]  [address] [place] [id number]     t: [phone]  f: [phone]       [email or web address]         [1]   Allergies  Allergen Reactions    Seasonal OTHER (SEE COMMENTS)    Codeine NAUSEA ONLY

## 2024-10-14 NOTE — TELEPHONE ENCOUNTER
Per Dr Cooley, please schedule a video visit for this patient 1 to 2 days after her MRI.  The patient's MRI is scheduled at [facility], so it will not be in Epic right now. Please ask patient for the appointment date.

## 2024-10-14 NOTE — TELEPHONE ENCOUNTER
Denied    Note from payer: Request Reference Number: [id number].  WEGOVY       INJ 0.25MG is denied for not meeting the prior authorization requirement(s).  Details of this decision are in the notice attached below or have been faxed to you.  Payer[facility] Case ID: [id number]    [phone]    [phone]  Electronic appeal: Not supported  Appeal instructions: Appeals are not supported through ePA. Please refer to the fax case notice for appeals information and instructions.  View History

## 2024-10-14 NOTE — TELEPHONE ENCOUNTER
Please call patient and let her know prior authorization for wegovy has been denied. They do not allow appeal. So I have placed an order for weight management referral. Please assist in scheduling this. They may be able to help with other options. Thanks!

## 2024-10-22 NOTE — TELEPHONE ENCOUNTER
[facility] reached out and stated that they will need order, xray if patient has one, and office visit note.    Please fax to [phone].    Please advise.

## 2024-11-04 NOTE — TELEPHONE ENCOUNTER
Called [facility] to verify her MRI  They said she has the CD and the radiologist report should come soon today

## 2024-11-05 NOTE — PROGRESS NOTES
Orthopaedic Surgery  [address] [address] [address]   [phone]      Chief Complaint:  Right Knee Pain    History of Present Illness:   Sherrie Feliz is a 44 year old female seen in clinic today as new for evaluation of their right knee. She reports an injury after a slip and fall at work onto her knee. Following this injury she had bruising and pain. Patient does report that following the injury she went on a trip to [place] where she did extensive walking. Pain is located over the medial aspect of the knee and described as aching. Reports that the area of bruising has been tender ever since. Their joint pain interferes with their activities of daily life such as walking, going up or down stairs, and exercising. Their condition is not improving.  Associated with their joint pain, they report catching or locking. Their symptoms are worsened by weightbearing activity and as the day progresses. They have difficulty with walking prolonged distances on flat surfaces as well as stairs. Their symptoms are made better by rest. They report no lower back or hip pain. No prior surgeries to the involved knee.    Prior imaging studies have included XRs. Treatment so far has consisted of conservative management including NSAIDs and combination of rest, ice, or elevation, stretching and topical medications. The stretching and topical medication provided relief but limited in duration.    Activities of Daily Life:  They use stairs: yes but difficult  Assistive devices used: none  Able to rise from a chair: Yes but with difficulty      Interval History  MRI was ordered to evaluate pathology causing her knee locking  Symptoms are about the same  Continues to have medial knee pain  Tylenol and NSAIDs are providing relief when she is taking it but relief does not last  Improves with rest but on her feet most of the day      PMH/PSH/Family History/Social History/Meds/Allergies:   History reviewed. No pertinent  past medical history.     History reviewed. No pertinent surgical history.     Family History   Problem Relation Age of Onset    Cancer Mother     Diabetes Mother     Other (Other) Paternal Grandmother     Heart Disorder Paternal Grandfather         Social History     Socioeconomic History    Marital status: Single     Spouse name: Not on file    Number of children: Not on file    Years of education: Not on file    Highest education level: Not on file   Occupational History    Not on file   Tobacco Use    Smoking status: Never     Passive exposure: Never    Smokeless tobacco: Never   Vaping Use    Vaping status: Never Used   Substance and Sexual Activity    Alcohol use: Yes     Comment: rare    Drug use: No    Sexual activity: Not on file   Other Topics Concern    Not on file   Social History Narrative    Not on file     Social Drivers of Health     Financial Resource Strain: Not on file   Food Insecurity: Not on file   Transportation Needs: Not on file   Physical Activity: Not on file   Stress: Not on file   Social Connections: Not on file   Housing Stability: At Risk (8/18/2023)    [facility]     Living Situation: Not on file     Housing Problems: Not on file        Current Outpatient Medications   Medication Instructions    albuterol 108 (90 Base) MCG/ACT Inhalation Aero Soln 2 puffs, Inhalation, Every 6 hours PRN    BREO ELLIPTA 100-25 MCG/INH Inhalation Aerosol Powder, Breath Activated No dose, route, or frequency recorded.    cephalexin 500 MG Oral Cap TAKE 1 CAPSULE BY MOUTH THREE TIMES DAILY AT MEALTIME FOR 7 DAYS FOR INFECTION    mometasone furoate 50 MCG/ACT Nasal Suspension 2 sprays, Daily    ondansetron (ZOFRAN) 4 mg, Oral, Every 8 hours PRN    semaglutide-weight management (WEGOVY) 0.25 mg, Subcutaneous, Weekly    Spacer/Aero Chamber Mouthpiece Does not apply Misc Use with HFA inhaler as directed        Allergies[1]       Physical Exam:   Vitals:    11/05/24  0925   Weight: (!) 427 lb (193.7 kg)   Height: 5' 5\" (1.651 m)     Estimated body mass index is 71.06 kg/m² as calculated from the following:    Height as of this encounter: 5' 5\" (1.651 m).    Weight as of this encounter: 427 lb (193.7 kg).    Constitutional: No acute distress, well nourished.  Eyes: Anicteric sclera, pink conjunctiva  Ears, Nose, Mouth and Throat: Normocephalic, atraumatic, moist mucous membranes  Cardiovascular: No pitting edema or varicosities in the lower extremities. Maneuvers demonstrate a negative Forrest's sign. No palpable cords in calf noted.   Respiratory: No respiratory distress, normal respiratory rhythm and effort   Neurological:  Oriented to person, place, and time.  Psychological:  Appropriate mood and affect.    Comprehensive right Knee Exam:      Inspection: No erythema, ecchymoses, or wounds. No rash. No previous incisions noted.  Alignment: mild varus  ROM: 0 - 100 degrees, flexion contracture: 0 degrees, quad lag: no  Stability: A/P stress: stable, firm endpoint, M/L stress: stable, firm endpoint  Pain or crepitus with ROM?: Yes  Tenderness to palpation at: medial joint line, lateral joint line, medial knee about the pes anserinus  Strength: Intact 5/5 strength SLR and TA/GS/FHL/EHL  Sensation: Grossly intact to light touch over SPN/DPN/Saph/Sural/Tibial nerve distributions  Vasc: Warm perfused extremity        Imaging:   Weightbearing XRs of the bilateral knee were obtained with AP, PA Flex, sunrise, and lateral views    They show moderate degenerative changes of the knee involving the medial compartment. No fracture or dislocation seen    I personally reviewed and interpreted the radiographs.      MRI shows worn but intact cartilage involving the medial compartment  There is meniscal tearing and fraying over the medial meniscus with some extrusion, likely degenerative tears      Assessment:     ICD-10-CM    1. Primary osteoarthritis of right knee  M17.11       2. Pes anserinus  tendinitis and bursitis  M76.899            Plan:   Patient with medial compartment joint space narrowing consistent with osteoarthritis. She is also point tender at her pes bursa insertion at the proximal tibia. There is tearing and fraying of the medial meniscus, likely degenerative.    Discussed treatment options including trying an injection today vs seeing a  for knee arthroscopy with meniscectomy.    She would like to try an injection today. See below for procedure note. We discussed that no surgery is recommended at a minimum of 3 months following injection due to increased risk of post-operative infection.    She should continue conservative management for now with rest, NSAIDs + Tylenol, stretching, and topical ointments. Referral provided for physical therapy to work on the pes bursitis and tendinitis.    Procedure Note  Risks and benefits of knee injection discussed with the patient, with risks including but not limited to pain and swelling at the injection site and/or within the knee joint, infection, elevation in blood pressure and/or glucose levels, facial flushing.  After informed consent, the patient's right knee was marked, locally anesthetized with skin refrigerant, prepped with topical antiseptic, and injected with a mixture of 1mL 40mg/mL Kenalog, 2mL 1% lidocaine and 2mL 0.5% marcaine through the inferolateral portal.  Hemostasis was achieved and a band-aid was applied.  The patient tolerated the procedure well.      Follow up as needed      Thank you very much for allowing me to participate in the care of this patient. If you have any questions, please do not hesitate to contact me.    Siddhartha Cooley MD  Adult Reconstruction    Department of Orthopaedic Surgery  [facility]     [address] [place] [address]  [address] [place] [address]     t: [phone]  f: [phone]       [email or web address]         [1]   Allergies  Allergen Reactions     Seasonal OTHER (SEE COMMENTS)    Codeine NAUSEA ONLY

## 2024-12-23 NOTE — TELEPHONE ENCOUNTER
Full Name & , verify w/ pt  Informed that her form is completed and ready to be picked up at the 2nd floor w/ the

## 2024-12-23 NOTE — TELEPHONE ENCOUNTER
Filled out and handed back    [Splenomegaly ___cm] : splenomegaly [unfilled] cm [Normal] : affect appropriate

## 2025-03-19 NOTE — H&P
New Patient Evaluation - History and Physical    CONSULT - Reason for Visit:  Adrenal nodule  Requesting Physician: Dr Larson    CHIEF COMPLAINT:    Chief Complaint   Patient presents with    Consult     Abnormal finding in Ct Scan         HISTORY OF PRESENT ILLNESS:   Sherrie Feliz is a 45 year old female who presents for evaluation of adrenal nodule    Incidentally noted on CT abd in 2023  Weight gain (central): Moon facies, buffalo hump: denies  Menstrual cycles: has IUD for last 10 years   Recurrent infections: denies  Muscle wasting : denies  Bleeding/clotting disorders: denies   Uncontrolled DM/HTN: denies       PAST MEDICAL HISTORY:   No past medical history on file.    PAST SURGICAL HISTORY:   No past surgical history on file.    CURRENT MEDICATIONS:    Current Outpatient Medications   Medication Sig Dispense Refill    albuterol 108 (90 Base) MCG/ACT Inhalation Aero Soln Inhale 2 puffs into the lungs every 6 (six) hours as needed for Wheezing or Shortness of Breath. 1 each 0    ondansetron (ZOFRAN) 4 mg tablet Take 1 tablet (4 mg total) by mouth every 8 (eight) hours as needed for Nausea. (Patient not taking: Reported on 3/19/2025) 20 tablet 0    mometasone furoate 50 MCG/ACT Nasal Suspension 2 sprays by Nasal route daily. (Patient not taking: Reported on 9/26/2024)         ALLERGIES:  Allergies[1]    SOCIAL HISTORY:    Social History     Socioeconomic History    Marital status: Single   Tobacco Use    Smoking status: Never     Passive exposure: Never    Smokeless tobacco: Never   Vaping Use    Vaping status: Never Used   Substance and Sexual Activity    Alcohol use: Yes     Comment: rare    Drug use: No       FAMILY HISTORY:   Family History   Problem Relation Age of Onset    Cancer Mother     Diabetes Mother     Other (Other) Paternal Grandmother     Heart Disorder Paternal Grandfather        ASSESSMENTS:     REVIEW OF SYSTEMS:  Constitutional: Negative for: weight change, fever, fatigue, cold/heat  intolerance  Eyes: Negative for:  Visual changes, proptosis, blurring  ENT: Negative for:  dysphagia, neck swelling, dysphonia  Respiratory: Negative for:  dyspnea, cough  Cardiovascular: Negative for:  chest pain, palpitations, orthopnea  GI: Negative for:  abdominal pain, nausea, vomiting, diarrhea, constipation, bleeding  Neurology: Negative for: headache, numbness, weakness  Genito-Urinary: Negative for: dysuria, frequency  Psychiatric: Negative for:  depression, anxiety  Hematology/Lymphatics: Negative for: bruising, lower extremity edema  Endocrine: Negative for: polyuria, polydypsia  Skin: Negative for: rash, blister, cellulitis,      PHYSICAL EXAM:   Vitals:    03/19/25 0913   BP: 120/80   Pulse: 88   Weight: (!) 433 lb (196.4 kg)   Height: 5' 5\" (1.651 m)     BMI: Body mass index is 72.05 kg/m².         General Appearance:  alert, well developed, in no acute distress  Head: Atraumatic  Eyes:  normal conjunctivae, sclera., normal sclera and normal pupils  Throat/Neck: normal sound to voice. Normal hearing, normal speech  Respiratory:  Speaking in full sentences, non-labored. no increased work of breathing, no audible wheezing    Neuro: motor grossly intact, moving all extremities without difficulty  Psychiatric:  oriented to time, self, and place        DATA:     Pertinent data reviewed      ASSESSMENT AND PLAN:    Patient is a 45 year old female with left adrenal adenoma  Discussed   Adrenal gland and structure  Adrenal axis    Discussed LOPEZ of adrenal adenoma  - Hormonal LOPEZ : annually for 5 years since discovery ( 2023)   > salivary cortisol  > plasma metanephrines  She does not have HTN hence luis. Renin testing is not indicated    - Imaging   MRI ordered     Further Mx will be based on results         Orders Placed This Encounter   Procedures    Metanephrines, Plasma Free    Salivary Cortisol         3/19/2025  Dary Roger MD        Patient verbalized a complete  understanding of all of the above and  did not have any further questions.          [1]   Allergies  Allergen Reactions    Seasonal OTHER (SEE COMMENTS)    Codeine NAUSEA ONLY

## 2025-05-27 NOTE — TELEPHONE ENCOUNTER
Cohg last 1-2 day sag. Got perfecto from [facility]. Congestion, cohg, and hot  Advil every 8 hours, T; noral    Over the counter Mucinex  Nice yellow  Reactive ariway diasea   Cough, 4 towels  Albuteo inhaler twice, not doing enough t  Cough id deep    Telephone visit:   Lay down

## 2025-05-27 NOTE — PROGRESS NOTES
HPI:    Patient ID: Sherrie Feliz is a 45 year old female.  Sherrie Feliz verbally consents to a Virtual/Telephone Check-In service on 05/28/25.    Duration of Service:  20 minutes    Patient has been referred to the [facility] website at [email or web address] to review the yearly Consent to Treat document.    Patient understands and accepts financial responsibility for any deductible, co-insurance and/or co-pays associated with this service.    HPI     Patient complains of cough, wheezing, and shortness of breath, started 1-2 days ago, keeping her up at night. Denies fever, nasal congestion, sore throat, vomiting, or diarrhea. Recently returned from vacation. Taking Advil  every 8 hours as needed with mild improvement . Also using Albuterol inhaler as needed with mild improvement. Has history of reactive airway disease and usually needs oral steroids when cough is bad.       Review of Systems   Constitutional: Negative.  Negative for fever.   Respiratory:  Positive for cough, shortness of breath and wheezing.    Cardiovascular: Negative.    Gastrointestinal: Negative.    Skin: Negative.    Neurological: Negative.    Psychiatric/Behavioral: Negative.              Current Outpatient Medications   Medication Sig Dispense Refill    predniSONE 20 MG Oral Tab Take 2 tablets (40 mg total) by mouth daily for 5 days. 10 tablet 0    ondansetron (ZOFRAN) 4 mg tablet Take 1 tablet (4 mg total) by mouth every 8 (eight) hours as needed for Nausea. (Patient not taking: Reported on 3/19/2025) 20 tablet 0    albuterol 108 (90 Base) MCG/ACT Inhalation Aero Soln Inhale 2 puffs into the lungs every 6 (six) hours as needed for Wheezing or Shortness of Breath. 1 each 0    mometasone furoate 50 MCG/ACT Nasal Suspension 2 sprays by Nasal route daily. (Patient not taking: Reported on 9/26/2024)       Allergies:  Allergies   Allergen Reactions    Seasonal OTHER (SEE COMMENTS)    Codeine NAUSEA ONLY      There were no  vitals taken for this visit.  There is no height or weight on file to calculate BMI.  PHYSICAL EXAM:   Physical Exam  Constitutional:       Appearance: Normal appearance.   Pulmonary:      Effort: Pulmonary effort is normal. No respiratory distress.      Comments: No acute respiratory distress noted over telephone   Skin:     General: Skin is warm.      Findings: No rash.   Neurological:      General: No focal deficit present.      Mental Status: She is alert and oriented to person, place, and time.   Psychiatric:         Mood and Affect: Mood normal.         Behavior: Behavior normal.         Thought Content: Thought content normal.         Judgment: Judgment normal.                ASSESSMENT/PLAN:   1. Mild intermittent reactive airway disease with acute exacerbation (HCC)  - Prednisone 20 mg, take 2 tablets daily x 5 days  - Continue Albuterol inhaler as needed  - Schedule office visit if symptoms worsening/not improving       No orders of the defined types were placed in this encounter.      Meds This Visit:  Requested Prescriptions     Signed Prescriptions Disp Refills    predniSONE 20 MG Oral Tab 10 tablet 0     Sig: Take 2 tablets (40 mg total) by mouth daily for 5 days.       Imaging & Referrals:  None         ID#[id number]

## 2025-05-27 NOTE — TELEPHONE ENCOUNTER
On call:     Kvng called with complaint of worsening cough, wheezing, and  shortness of breath  Has reactive airway disease  See virtual visit encounter

## 2025-07-28 NOTE — PROGRESS NOTES
Orthopaedic Surgery  [address] [address]   [phone]      Chief Complaint:  Left Knee Pain    Interval History: 7/28/2025  Patient returns for follow-up today  Today she complains of left knee pain  It is located over the anterior aspect of the knee, primarily medial  Patient reports that she has been trying to wean off of Tylenol and ibuprofen slowly over the past few weeks  She is down to 1 tablet of ibuprofen  However she noted some left knee pain starting around that time  Her right knee which we previously injected has been doing really well  She continues to go to [facility] to work on mobilization    History of Present Illness: 11/5/24  Sherrie Feliz is a 45 year old female seen in clinic today as new for evaluation of their right knee. She reports an injury after a slip and fall at work onto her knee. Following this injury she had bruising and pain. Patient does report that following the injury she went on a trip to [place] where she did extensive walking. Pain is located over the medial aspect of the knee and described as aching. Reports that the area of bruising has been tender ever since. Their joint pain interferes with their activities of daily life such as walking, going up or down stairs, and exercising. Their condition is not improving.  Associated with their joint pain, they report catching or locking. Their symptoms are worsened by weightbearing activity and as the day progresses. They have difficulty with walking prolonged distances on flat surfaces as well as stairs. Their symptoms are made better by rest. They report no lower back or hip pain. No prior surgeries to the involved knee.    Prior imaging studies have included XRs. Treatment so far has consisted of conservative management including NSAIDs and combination of rest, ice, or elevation, stretching and topical medications. The stretching and topical medication provided relief but limited in  duration.    Activities of Daily Life:  They use stairs: yes but difficult  Assistive devices used: none  Able to rise from a chair: Yes but with difficulty      Interval History  MRI was ordered to evaluate pathology causing her knee locking  Symptoms are about the same  Continues to have medial knee pain  Tylenol and NSAIDs are providing relief when she is taking it but relief does not last  Improves with rest but on her feet most of the day      PMH/PSH/Family History/Social History/Meds/Allergies:   History reviewed. No pertinent past medical history.     History reviewed. No pertinent surgical history.     Family History   Problem Relation Age of Onset    Cancer Mother     Diabetes Mother     Other (Other) Paternal Grandmother     Heart Disorder Paternal Grandfather         Social History     Socioeconomic History    Marital status: Single     Spouse name: Not on file    Number of children: Not on file    Years of education: Not on file    Highest education level: Not on file   Occupational History    Not on file   Tobacco Use    Smoking status: Never     Passive exposure: Never    Smokeless tobacco: Never   Vaping Use    Vaping status: Never Used   Substance and Sexual Activity    Alcohol use: Yes     Comment: rare    Drug use: No    Sexual activity: Not on file   Other Topics Concern    Not on file   Social History Narrative    Not on file     Social Drivers of Health     Food Insecurity: Not on file   Transportation Needs: Not on file   Stress: Not on file   Housing Stability: At Risk (8/18/2023)    Received from [facility]     Living Situation: Not on file     Housing Problems: Not on file        Current Outpatient Medications   Medication Instructions    albuterol 108 (90 Base) MCG/ACT Inhalation Aero Soln 2 puffs, Inhalation, Every 6 hours PRN    BREO ELLIPTA 100-25 MCG/INH Inhalation Aerosol Powder, Breath Activated No dose, route, or frequency recorded.    cephalexin 500 MG Oral Cap  TAKE 1 CAPSULE BY MOUTH THREE TIMES DAILY AT MEALTIME FOR 7 DAYS FOR INFECTION    mometasone furoate 50 MCG/ACT Nasal Suspension 2 sprays, Daily    ondansetron (ZOFRAN) 4 mg, Oral, Every 8 hours PRN    semaglutide-weight management (WEGOVY) 0.25 mg, Subcutaneous, Weekly    Spacer/Aero Chamber Mouthpiece Does not apply Misc Use with HFA inhaler as directed        Allergies[1]       Physical Exam:   Vitals:    07/28/25 1050   Weight: (!) 428 lb (194.1 kg)   Height: 5' 5\" (1.651 m)     Estimated body mass index is 71.22 kg/m² as calculated from the following:    Height as of this encounter: 5' 5\" (1.651 m).    Weight as of this encounter: 428 lb (194.1 kg).    Constitutional: No acute distress, well nourished.  Eyes: Anicteric sclera, pink conjunctiva  Ears, Nose, Mouth and Throat: Normocephalic, atraumatic, moist mucous membranes  Cardiovascular: No pitting edema or varicosities in the lower extremities  Respiratory: No respiratory distress, normal respiratory rhythm and effort   Neurological:  Oriented to person, place, and time.  Psychological:  Appropriate mood and affect.    Left Knee Exam:      Inspection: No erythema, ecchymoses, or wounds. No rash. No previous incisions noted.  Alignment: mild varus  ROM: 0 - 100 degrees, flexion contracture: 0 degrees, quad lag: no  Stability: A/P stress: stable, firm endpoint, M/L stress: stable, firm endpoint  Pain or crepitus with ROM?: Yes  Tenderness to palpation at: medial joint line; non-tender lateral joint line  Strength: Intact 5/5 strength SLR and TA/GS/FHL/EHL  Sensation: Grossly intact to light touch over SPN/DPN/Saph/Sural/Tibial nerve distributions  Vasc: Warm perfused extremity        Imaging:   Weightbearing XRs of the bilateral knee were reviewed with AP, PA Flex, sunrise, and lateral views    They show moderate degenerative changes of the LEFT knee involving the medial compartment. No fracture or dislocation seen    I personally reviewed and interpreted the  radiographs.      MRI Right Knee shows worn but intact cartilage involving the medial compartment  There is meniscal tearing and fraying over the medial meniscus with some extrusion, likely degenerative tears      Assessment:     ICD-10-CM    1. Primary osteoarthritis of left knee  M17.12       2. Primary osteoarthritis of right knee  M17.11              Plan:   Patient with medial compartment joint space narrowing consistent with left knee osteoarthritis    Discussed treatment options including trying an injection today    She would like to try an injection today. See below for procedure note    She will continue conservative management for now with rest, NSAIDs + Tylenol as needed, stretching, and topical ointments. We also discussed HA injections as a pre-surgical option.    Procedure Note - Left Knee CSI    Risks and benefits of knee injection discussed with the patient, with risks including but not limited to pain and swelling at the injection site and/or within the knee joint, infection, elevation in blood pressure and/or glucose levels, facial flushing.  After informed consent, the patient's left knee was marked, locally anesthetized with skin refrigerant, prepped with topical antiseptic, and injected with a mixture of 1mL 40mg/mL Kenalog, 2mL 1% lidocaine and 2mL 0.5% marcaine through the inferolateral portal.  Hemostasis was achieved and a band-aid was applied.  The patient tolerated the procedure well.    Follow up as needed    Thank you very much for allowing me to participate in the care of this patient. If you have any questions, please do not hesitate to contact me.    Siddhartha Cooley MD  Adult Reconstruction    Department of Orthopaedic Surgery  [facility]     [address] [place] [address]  [address] [place] [address]     t: [phone]  f: [phone]       [email or web address]         [1]   Allergies  Allergen Reactions    Seasonal OTHER (SEE COMMENTS)    Codeine  NAUSEA ONLY

## (undated) NOTE — LETTER
12/13/21        Stephani Hair  [id number] [id number]      Dear Bri Capps,    [facility] records indicate that you have outstanding lab work and or testing that was ordered for you and has not yet been completed:  Orders Placed This Encounter

## (undated) NOTE — MR AVS SNAPSHOT
[address], [address]  [facility]  [phone]               Thank you for choosing us for your health care visit with Varghese Feldman DO.   We are glad to serve you and happy to provide you with this summary Medical Issues Discussed Today     Acute pain of left knee    -  Primary    Diarrhea of infectious origin        Patellofemoral pain syndrome of left knee          Instructions and Information about Your Health    VMO exercises for patellofemoral syndrome Healthy Diet and Regular Exercise  [employer] [facility] [employer] for making healthy food choices  -   Enjoy your food, but eat less. Fully enjoy your food when eating. Don’t eat while distracted and slow down. Avoid over sized portions.    Farhana Dixon

## (undated) NOTE — Clinical Note
Thank you for the consult. I saw Ms. Feliz in the endocrine/diabetes clinic today. Please see attached my note. Please feel free to contact me with any questions. Thanks!

## (undated) NOTE — LETTER
1/25/2022              [address] [place] [id number]         To Whom it may concern: This is to certify that Cheryle Ishihara had an appointment on 12/6/2021 for a Physical Exam with Eveline Sanchez DO. Sincerely,     DO Gus Yun [facility]  [phone]        Document electronically generated by:   Osman Hart

## (undated) NOTE — LETTER
Department: [facility], [facility], [address], [place]  Phone: [phone]  Ordering Provider: Sagrario Johnson  Ordering Provider Address: [address] [facility]  Ordering Provider Phone: [phone]  Ordering Provider Fax: [phone]